# Patient Record
Sex: FEMALE | Race: BLACK OR AFRICAN AMERICAN | NOT HISPANIC OR LATINO | Employment: OTHER | URBAN - METROPOLITAN AREA
[De-identification: names, ages, dates, MRNs, and addresses within clinical notes are randomized per-mention and may not be internally consistent; named-entity substitution may affect disease eponyms.]

---

## 2017-03-03 ENCOUNTER — HOSPITAL ENCOUNTER (INPATIENT)
Facility: OTHER | Age: 52
LOS: 2 days | Discharge: HOME | DRG: 281 | End: 2017-03-06
Attending: EMERGENCY MEDICINE | Admitting: HOSPITALIST
Payer: COMMERCIAL

## 2017-03-03 DIAGNOSIS — R07.9 CHEST PAIN: ICD-10-CM

## 2017-03-03 DIAGNOSIS — I21.4 NON-ST ELEVATION MYOCARDIAL INFARCTION (NSTEMI): Primary | ICD-10-CM

## 2017-03-03 DIAGNOSIS — I10 ESSENTIAL HYPERTENSION: ICD-10-CM

## 2017-03-03 DIAGNOSIS — R07.9 ACUTE CHEST PAIN: ICD-10-CM

## 2017-03-03 DIAGNOSIS — I21.4 NSTEMI (NON-ST ELEVATED MYOCARDIAL INFARCTION): ICD-10-CM

## 2017-03-03 DIAGNOSIS — J45.20 MILD INTERMITTENT ASTHMA WITHOUT COMPLICATION: ICD-10-CM

## 2017-03-03 DIAGNOSIS — R07.1 CHEST PAIN ON BREATHING: ICD-10-CM

## 2017-03-03 DIAGNOSIS — I24.9 ACS (ACUTE CORONARY SYNDROME): ICD-10-CM

## 2017-03-03 DIAGNOSIS — I49.8 BIGEMINY: ICD-10-CM

## 2017-03-03 LAB
ALBUMIN SERPL BCP-MCNC: 3.9 G/DL
ALP SERPL-CCNC: 71 U/L
ALT SERPL W/O P-5'-P-CCNC: 32 U/L
ANION GAP SERPL CALC-SCNC: 7 MMOL/L
AST SERPL-CCNC: 23 U/L
BASOPHILS # BLD AUTO: 0.01 K/UL
BASOPHILS NFR BLD: 0.1 %
BILIRUB SERPL-MCNC: 0.7 MG/DL
BILIRUB UR QL STRIP: NEGATIVE
BNP SERPL-MCNC: 24 PG/ML
BUN SERPL-MCNC: 11 MG/DL
CALCIUM SERPL-MCNC: 9.2 MG/DL
CHLORIDE SERPL-SCNC: 106 MMOL/L
CLARITY UR: CLEAR
CO2 SERPL-SCNC: 28 MMOL/L
COLOR UR: YELLOW
CREAT SERPL-MCNC: 0.8 MG/DL
DIFFERENTIAL METHOD: ABNORMAL
EOSINOPHIL # BLD AUTO: 0.3 K/UL
EOSINOPHIL NFR BLD: 4.7 %
ERYTHROCYTE [DISTWIDTH] IN BLOOD BY AUTOMATED COUNT: 14.6 %
EST. GFR  (AFRICAN AMERICAN): >60 ML/MIN/1.73 M^2
EST. GFR  (NON AFRICAN AMERICAN): >60 ML/MIN/1.73 M^2
GLUCOSE SERPL-MCNC: 121 MG/DL
GLUCOSE UR QL STRIP: NEGATIVE
HCT VFR BLD AUTO: 37 %
HGB BLD-MCNC: 12.3 G/DL
HGB UR QL STRIP: NEGATIVE
KETONES UR QL STRIP: NEGATIVE
LEUKOCYTE ESTERASE UR QL STRIP: NEGATIVE
LYMPHOCYTES # BLD AUTO: 4.1 K/UL
LYMPHOCYTES NFR BLD: 58.6 %
MAGNESIUM SERPL-MCNC: 2.1 MG/DL
MCH RBC QN AUTO: 25.8 PG
MCHC RBC AUTO-ENTMCNC: 33.2 %
MCV RBC AUTO: 78 FL
MONOCYTES # BLD AUTO: 0.3 K/UL
MONOCYTES NFR BLD: 4.3 %
NEUTROPHILS # BLD AUTO: 2.2 K/UL
NEUTROPHILS NFR BLD: 32.2 %
NITRITE UR QL STRIP: NEGATIVE
PH UR STRIP: 7 [PH] (ref 5–8)
PLATELET # BLD AUTO: 322 K/UL
PMV BLD AUTO: 9.4 FL
POTASSIUM SERPL-SCNC: 3.6 MMOL/L
PROT SERPL-MCNC: 7.9 G/DL
PROT UR QL STRIP: NEGATIVE
RBC # BLD AUTO: 4.77 M/UL
SODIUM SERPL-SCNC: 141 MMOL/L
SP GR UR STRIP: 1.01 (ref 1–1.03)
TROPONIN I SERPL DL<=0.01 NG/ML-MCNC: 0.01 NG/ML
TROPONIN I SERPL DL<=0.01 NG/ML-MCNC: 0.7 NG/ML
URN SPEC COLLECT METH UR: NORMAL
UROBILINOGEN UR STRIP-ACNC: NEGATIVE EU/DL
WBC # BLD AUTO: 6.96 K/UL

## 2017-03-03 PROCEDURE — 99223 1ST HOSP IP/OBS HIGH 75: CPT | Mod: ,,, | Performed by: HOSPITALIST

## 2017-03-03 PROCEDURE — 25000003 PHARM REV CODE 250

## 2017-03-03 PROCEDURE — 25000003 PHARM REV CODE 250: Performed by: HOSPITALIST

## 2017-03-03 PROCEDURE — 84484 ASSAY OF TROPONIN QUANT: CPT | Mod: 91

## 2017-03-03 PROCEDURE — 63600175 PHARM REV CODE 636 W HCPCS

## 2017-03-03 PROCEDURE — 83880 ASSAY OF NATRIURETIC PEPTIDE: CPT

## 2017-03-03 PROCEDURE — 93010 ELECTROCARDIOGRAM REPORT: CPT | Mod: 76,,, | Performed by: INTERNAL MEDICINE

## 2017-03-03 PROCEDURE — 99285 EMERGENCY DEPT VISIT HI MDM: CPT | Mod: 25

## 2017-03-03 PROCEDURE — G0378 HOSPITAL OBSERVATION PER HR: HCPCS

## 2017-03-03 PROCEDURE — 80053 COMPREHEN METABOLIC PANEL: CPT

## 2017-03-03 PROCEDURE — 83735 ASSAY OF MAGNESIUM: CPT

## 2017-03-03 PROCEDURE — 81003 URINALYSIS AUTO W/O SCOPE: CPT

## 2017-03-03 PROCEDURE — 84484 ASSAY OF TROPONIN QUANT: CPT

## 2017-03-03 PROCEDURE — 36415 COLL VENOUS BLD VENIPUNCTURE: CPT

## 2017-03-03 PROCEDURE — 93005 ELECTROCARDIOGRAM TRACING: CPT

## 2017-03-03 PROCEDURE — 85025 COMPLETE CBC W/AUTO DIFF WBC: CPT

## 2017-03-03 RX ORDER — OLMESARTAN MEDOXOMIL 20 MG/1
40 TABLET ORAL DAILY
Status: DISCONTINUED | OUTPATIENT
Start: 2017-03-03 | End: 2017-03-06 | Stop reason: HOSPADM

## 2017-03-03 RX ORDER — ACETAMINOPHEN 325 MG/1
650 TABLET ORAL EVERY 8 HOURS PRN
Status: DISCONTINUED | OUTPATIENT
Start: 2017-03-03 | End: 2017-03-06 | Stop reason: HOSPADM

## 2017-03-03 RX ORDER — PANTOPRAZOLE SODIUM 40 MG/1
40 TABLET, DELAYED RELEASE ORAL DAILY
Status: DISCONTINUED | OUTPATIENT
Start: 2017-03-03 | End: 2017-03-06 | Stop reason: HOSPADM

## 2017-03-03 RX ORDER — ASPIRIN 325 MG
325 TABLET ORAL DAILY
Status: DISCONTINUED | OUTPATIENT
Start: 2017-03-03 | End: 2017-03-04

## 2017-03-03 RX ORDER — AMLODIPINE BESYLATE 5 MG/1
5 TABLET ORAL DAILY
Status: DISCONTINUED | OUTPATIENT
Start: 2017-03-03 | End: 2017-03-04

## 2017-03-03 RX ORDER — ONDANSETRON 8 MG/1
8 TABLET, ORALLY DISINTEGRATING ORAL EVERY 8 HOURS PRN
Status: DISCONTINUED | OUTPATIENT
Start: 2017-03-03 | End: 2017-03-06 | Stop reason: HOSPADM

## 2017-03-03 RX ORDER — ZOLPIDEM TARTRATE 5 MG/1
5 TABLET ORAL NIGHTLY PRN
Status: DISCONTINUED | OUTPATIENT
Start: 2017-03-03 | End: 2017-03-06 | Stop reason: HOSPADM

## 2017-03-03 RX ORDER — METOPROLOL TARTRATE 25 MG/1
25 TABLET, FILM COATED ORAL 2 TIMES DAILY PRN
Status: DISCONTINUED | OUTPATIENT
Start: 2017-03-03 | End: 2017-03-03

## 2017-03-03 RX ORDER — OLMESARTAN MEDOXOMIL AND HYDROCHLOROTHIAZIDE 40/25 40; 25 MG/1; MG/1
1 TABLET ORAL DAILY
Status: DISCONTINUED | OUTPATIENT
Start: 2017-03-03 | End: 2017-03-03

## 2017-03-03 RX ORDER — ALBUTEROL SULFATE 90 UG/1
1-2 AEROSOL, METERED RESPIRATORY (INHALATION) EVERY 4 HOURS PRN
Status: ON HOLD | COMMUNITY
End: 2017-03-06

## 2017-03-03 RX ORDER — HYDROCHLOROTHIAZIDE 25 MG/1
25 TABLET ORAL DAILY
Status: DISCONTINUED | OUTPATIENT
Start: 2017-03-03 | End: 2017-03-05

## 2017-03-03 RX ORDER — ENOXAPARIN SODIUM 100 MG/ML
40 INJECTION SUBCUTANEOUS EVERY 24 HOURS
Status: DISCONTINUED | OUTPATIENT
Start: 2017-03-03 | End: 2017-03-05

## 2017-03-03 RX ORDER — OXYCODONE HYDROCHLORIDE 5 MG/1
5 TABLET ORAL EVERY 4 HOURS PRN
Status: DISCONTINUED | OUTPATIENT
Start: 2017-03-03 | End: 2017-03-06 | Stop reason: HOSPADM

## 2017-03-03 RX ORDER — ATORVASTATIN CALCIUM 20 MG/1
80 TABLET, FILM COATED ORAL DAILY
Status: DISCONTINUED | OUTPATIENT
Start: 2017-03-03 | End: 2017-03-06 | Stop reason: HOSPADM

## 2017-03-03 RX ORDER — METOPROLOL TARTRATE 25 MG/1
25 TABLET, FILM COATED ORAL 2 TIMES DAILY
Status: DISCONTINUED | OUTPATIENT
Start: 2017-03-03 | End: 2017-03-04

## 2017-03-03 RX ADMIN — METOPROLOL TARTRATE 25 MG: 25 TABLET, FILM COATED ORAL at 06:03

## 2017-03-03 RX ADMIN — HYDROCHLOROTHIAZIDE 25 MG: 25 TABLET ORAL at 10:03

## 2017-03-03 RX ADMIN — AMLODIPINE BESYLATE 5 MG: 5 TABLET ORAL at 10:03

## 2017-03-03 RX ADMIN — METOPROLOL TARTRATE 25 MG: 25 TABLET, FILM COATED ORAL at 12:03

## 2017-03-03 RX ADMIN — PANTOPRAZOLE SODIUM 40 MG: 40 TABLET, DELAYED RELEASE ORAL at 12:03

## 2017-03-03 RX ADMIN — ATORVASTATIN CALCIUM 80 MG: 20 TABLET, FILM COATED ORAL at 06:03

## 2017-03-03 RX ADMIN — OLMESARTAN MEDOXOMIL 40 MG: 20 TABLET, FILM COATED ORAL at 10:03

## 2017-03-03 RX ADMIN — ENOXAPARIN SODIUM 40 MG: 100 INJECTION SUBCUTANEOUS at 12:03

## 2017-03-03 NOTE — IP AVS SNAPSHOT
[facility] Location ([facility])  [address] [place], [place] [address]  Phone: [phone]           Patient Discharge Instructions     Our goal is to set you up for success. This packet includes information on your condition, medications, and your home care. It will help you to care for yourself so you don't get sicker and need to go back to the hospital.     Please ask your nurse if you have any questions.        There are many details to remember when preparing to leave the hospital. Here is what you will need to do:    1. Take your medicine. If you are prescribed medications, review your Medication List in the following pages. You may have new medications to  at the pharmacy and others that you'll need to stop taking. Review the instructions for how and when to take your medications. Talk with your doctor or nurses if you are unsure of what to do.     2. Go to your follow-up appointments. Specific follow-up information is listed in the following pages. Your may be contacted by a transition nurse or clinical provider about future appointments. Be sure we have all of the phone numbers to reach you, if needed. Please contact your provider's office if you are unable to make an appointment.     3. Watch for warning signs. Your doctor or nurse will give you detailed warning signs to watch for and when to call for assistance. These instructions may also include educational information about your condition. If you experience any of warning signs to your health, call your doctor.               [facility]  Unless otherwise directed by your provider, please contact [facility], our nurse care line that is available for 24/7 assistance.     [phone] (toll-free)    Registered nurses in the [facility] provide clinical advisement, health education, appointment booking, and other advisory services.                    ** Verify the list of medication(s) below is accurate and up to  date. Carry this with you in case of emergency. If your medications have changed, please notify your healthcare provider.             Medication List      START taking these medications        Additional Info                      aspirin 81 MG EC tablet   Commonly known as:  ECOTRIN   Quantity:  30 tablet   Refills:  0   Dose:  81 mg    Last time this was given:  81 mg on 3/6/2017  3:51 PM   Instructions:  Take 1 tablet (81 mg total) by mouth once daily.     Begin Date    AM    Noon    PM    Bedtime       atorvastatin 80 MG tablet   Commonly known as:  LIPITOR   Quantity:  15 tablet   Refills:  0   Dose:  40 mg    Last time this was given:  80 mg on 3/6/2017  3:51 PM   Instructions:  Take 0.5 tablets (40 mg total) by mouth every evening.     Begin Date    AM    Noon    PM    Bedtime       carvedilol 12.5 MG tablet   Commonly known as:  COREG   Quantity:  60 tablet   Refills:  0   Dose:  12.5 mg    Instructions:  Take 1 tablet (12.5 mg total) by mouth 2 (two) times daily with meals.     Begin Date    AM    Noon    PM    Bedtime       clopidogrel 75 mg tablet   Commonly known as:  PLAVIX   Quantity:  30 tablet   Refills:  0   Dose:  75 mg    Last time this was given:  75 mg on 3/6/2017  3:51 PM   Instructions:  Take 1 tablet (75 mg total) by mouth once daily.     Begin Date    AM    Noon    PM    Bedtime         CHANGE how you take these medications        Additional Info                      albuterol 90 mcg/actuation inhaler   Commonly known as:  VENTOLIN HFA   Quantity:  18 g   Refills:  0   Dose:  2 puff   What changed:    - how much to take  - when to take this    Instructions:  Inhale 2 puffs into the lungs every 6 (six) hours as needed for Shortness of Breath. Rescue     Begin Date    AM    Noon    PM    Bedtime       amlodipine 10 MG tablet   Commonly known as:  NORVASC   Quantity:  30 tablet   Refills:  0   Dose:  10 mg   What changed:    - medication strength  - how much to take  - when to take this    Last  time this was given:  10 mg on 3/6/2017  3:51 PM   Instructions:  Take 1 tablet (10 mg total) by mouth once daily.     Begin Date    AM    Noon    PM    Bedtime         CONTINUE taking these medications        Additional Info                      BENICAR HCT 40-25 mg per tablet   Refills:  0   Dose:  1 tablet   Generic drug:  olmesartan-hydrochlorothiazide    Instructions:  Take 1 tablet by mouth every morning.     Begin Date    AM    Noon    PM    Bedtime       fluticasone 50 mcg/actuation nasal spray   Commonly known as:  FLONASE   Refills:  0   Dose:  1-2 spray    Instructions:  1-2 sprays by Nasal route once daily.     Begin Date    AM    Noon    PM    Bedtime            Where to Get Your Medications      These medications were sent to [facility]/pharmacy #[id number] - [place], [place]  [address] [facility]  [address] [facility] [address]     Phone:  [phone]     albuterol 90 mcg/actuation inhaler    amlodipine 10 MG tablet    aspirin 81 MG EC tablet    atorvastatin 80 MG tablet    carvedilol 12.5 MG tablet    clopidogrel 75 mg tablet                  Please bring to all follow up appointments:    1. A copy of your discharge instructions.  2. All medicines you are currently taking in their original bottles.  3. Identification and insurance card.    Please arrive 15 minutes ahead of scheduled appointment time.    Please call 24 hours in advance if you must reschedule your appointment and/or time.        Your Scheduled Appointments     Mar 16, 2017  1:00 PM CDT   New Patient with Artur Freire MD   [facility]    [address]  [place] [address] [phone]              Follow-up Information     Follow up with Brenden Neal MD. Schedule an appointment as soon as possible for a visit in 1 week.    Specialty:  Family Medicine    Contact information:    [address] [place] [address] [phone]          Follow up with Raiza Renee MD. Schedule an  "appointment as soon as possible for a visit in 2 weeks.    Specialty:  Cardiology    Contact information:    [address]  [facility] [address]  [phone]          Discharge Instructions     Future Orders    Activity as tolerated     Call MD for:  difficulty breathing or increased cough     Call MD for:  persistent dizziness, light-headedness, or visual disturbances     Call MD for:  persistent nausea and vomiting or diarrhea     Call MD for:  severe persistent headache     Call MD for:  severe uncontrolled pain     Call MD for:  temperature >100.4     Call MD for:  worsening rash     Diet Cardiac         Primary Diagnosis     Your primary diagnosis was:  Heart Attack      Admission Information     Date & Time Provider Department CSN    3/3/2017  8:26 AM Poncho Morales MD [facility] [id number]      Care Providers     Provider Role Specialty Primary office phone    Poncho Morales MD Attending Provider Hospitalist [phone]    Raiza Renee MD Consulting Physician  Cardiology [phone]    Raiza Renee MD Surgeon  Cardiology [phone]      Your Vitals Were     BP Pulse Temp Resp Height Weight    132/71 (BP Location: Right arm, Patient Position: Lying, BP Method: Automatic) 91 97.6 °F (36.4 °C) (Oral) 16 5' 3" (1.6 m) 78 kg (172 lb)    SpO2 BMI             100% 30.47 kg/m2         Recent Lab Values     No lab values to display.      Allergies as of 3/6/2017        Reactions    Ace Inhibitors       Advance Directives     An advance directive is a document which, in the event you are no longer able to make decisions for yourself, tells your healthcare team what kind of treatment you do or do not want to receive, or who you would like to make those decisions for you.  If you do not currently have an advance directive, [facility] encourages you to create one.  For more information call:  [phone]), [phone] ([phone]),  or log on to " [email or web address].        Language Assistance Services     ATTENTION: Language assistance services are available, free of charge. Please call [phone].      ATENCIÓN: Si habla harpreet, tiene a cabello disposición servicios gratuitos de asistencia lingüística. Llame al [phone].     CHÚ Ý: N?u b?n nói Ti?ng Vi?t, có các d?ch v? h? tr? ngôn ng? mi?n phí dành cho b?n. G?i s? [phone].         [facility] complies with applicable Federal civil rights laws and does not discriminate on the basis of race, color, national origin, age, disability, or sex.

## 2017-03-03 NOTE — PLAN OF CARE
03/03/17 1009   ED Admissions Case Approval   ED Admissions Case Approval (!) CM Approved  (OBS )

## 2017-03-03 NOTE — ED PROVIDER NOTES
"Encounter Date: 3/3/2017    SCRIBE #1 NOTE: I, Uli Negron, am scribing for, and in the presence of,  Dr. Victoria. I have scribed the entire note.       History     Chief Complaint   Patient presents with    Chest Pain     pt started having chest pain 3 days ago dyspnea on exertion with some nausea      Review of patient's allergies indicates:   Allergen Reactions    Ace inhibitors      HPI Comments: Time seen by provider: 8:30 AM    This is a 51 y.o. female with HTN who presents via EMS with complaint of intermittent CP starting 3 days ago. She notes accompanying SOB "like water in my lungs." She had some left arm pain and nausea as well as a HA and sinus drip. She denies vomiting, palpitations, fever, leg swelling and sweating. She has no personal or family history of MI. She denies smoking.    The history is provided by the patient.     Past Medical History:   Diagnosis Date    Abnormal Pap smear of cervix     Asthma     Hypertension      History reviewed. No pertinent surgical history.  Family History   Problem Relation Age of Onset    Breast cancer Neg Hx     Colon cancer Neg Hx     Ovarian cancer Neg Hx      Social History   Substance Use Topics    Smoking status: Never Smoker    Smokeless tobacco: None    Alcohol use No     Review of Systems   Constitutional: Negative for chills, diaphoresis and fever.   HENT: Negative for congestion and sore throat.    Eyes: Negative for pain.   Respiratory: Positive for shortness of breath. Negative for cough.    Cardiovascular: Positive for chest pain. Negative for palpitations and leg swelling.   Gastrointestinal: Negative for diarrhea, nausea and vomiting.   Genitourinary: Negative for difficulty urinating and dysuria.   Musculoskeletal: Negative for back pain and myalgias.   Skin: Negative for color change.   Neurological: Negative for light-headedness, numbness and headaches.   Psychiatric/Behavioral: Negative for behavioral problems and confusion. " "      Physical Exam     Vitals:    03/03/17 0800 03/03/17 0842 03/03/17 0942 03/03/17 1012   BP: 139/86 139/86 (!) 163/79 (!) 176/99   Pulse: 82 78 78 74   Resp: 20      Temp: 98.5 °F (36.9 °C)      TempSrc: Oral      SpO2: 99% 99% 97% 99%   Weight: 78 kg (172 lb)      Height: 5' 3" (1.6 m)       03/03/17 1050 03/03/17 1053 03/03/17 1142 03/03/17 1200   BP: (!) 182/117 (!) 182/117 (!) 158/99    Pulse:  80 72 87   Resp:       Temp:       TempSrc:       SpO2:  100% 98%    Weight:       Height:        03/03/17 1221 03/03/17 1223   BP: (!) 185/92 (!) 178/90   Pulse: 84    Resp: 18    Temp: 98 °F (36.7 °C)    TempSrc: Oral    SpO2: 95%    Weight:     Height:        Physical Exam    Nursing note and vitals reviewed.  Constitutional: She appears well-developed and well-nourished. She is not diaphoretic. No distress.   HENT:   Head: Normocephalic and atraumatic.   Mouth/Throat: Oropharynx is clear and moist.   Eyes: Conjunctivae are normal. Pupils are equal, round, and reactive to light. Right eye exhibits no discharge. Left eye exhibits no discharge.   Neck: Normal range of motion. Neck supple. No JVD present.   Cardiovascular: Normal rate, regular rhythm, normal heart sounds and intact distal pulses. Exam reveals no gallop and no friction rub.    No murmur heard.  Pulmonary/Chest: Breath sounds normal. No respiratory distress. She has no wheezes. She has no rhonchi. She has no rales.   Abdominal: Soft. Bowel sounds are normal. She exhibits no distension. There is no tenderness. There is no rebound and no guarding.   Musculoskeletal: Normal range of motion. She exhibits edema (trace non-pitting edema to the ankles bilaterally). She exhibits no tenderness.   Neurological: She is alert and oriented to person, place, and time. She has normal strength. No sensory deficit.   Skin: Skin is warm and dry. No rash and no abscess noted. No erythema. No pallor.   Psychiatric: She has a normal mood and affect. Her behavior is normal. " Judgment and thought content normal.         ED Course   Critical Care  Date/Time: 3/3/2017 12:00 PM  Performed by: EVANGELINA URIBE  Authorized by: ALBER RIZO   Direct patient critical care time: 30 minutes  Additional history critical care time: 5 minutes  Ordering / reviewing critical care time: 5 minutes  Consulting other physicians critical care time: 5 minutes  Total critical care time (exclusive of procedural time) : 45 minutes  Critical care was necessary to treat or prevent imminent or life-threatening deterioration of the following conditions: cardiac failure.  Critical care was time spent personally by me on the following activities: development of treatment plan with patient or surrogate, evaluation of patient's response to treatment, examination of patient, obtaining history from patient or surrogate, ordering and performing treatments and interventions, ordering and review of laboratory studies, ordering and review of radiographic studies, pulse oximetry, re-evaluation of patient's condition and review of old charts.        Labs Reviewed   CBC W/ AUTO DIFFERENTIAL - Abnormal; Notable for the following:        Result Value    MCV 78 (*)     MCH 25.8 (*)     RDW 14.6 (*)     Gran% 32.2 (*)     Lymph% 58.6 (*)     All other components within normal limits   COMPREHENSIVE METABOLIC PANEL - Abnormal; Notable for the following:     Glucose 121 (*)     Anion Gap 7 (*)     All other components within normal limits   MAGNESIUM   TROPONIN I   URINALYSIS   B-TYPE NATRIURETIC PEPTIDE     EKG Readings: (Independently Interpreted)   Initial Reading: No STEMI.   Sinus rhythm at a rate of 87 with bigeminy. Normal axis. Alternating narrow then prolonged QRS complexes. No STEMI.        X-Rays:   Independently Interpreted Readings:   Chest X-Ray: No cardiomegaly. No infiltrate. No pneumothorax.      Imaging Results         X-Ray Chest 1 View (Final result) Result time:  03/03/17 08:54:04    Final result by Thong BUCK  MD Pravin (03/03/17 08:54:04)    Impression:      No acute abnormality.      Electronically signed by: JEET CLAYTON  Date:     03/03/17  Time:    08:54     Narrative:    CLINICAL HISTORY:  Chest pain    TECHNIQUE: Single view portable frontal radiograph of the chest    COMPARISON: N/A      FINDINGS:  Support devices: None    Chest: Cardiac silhouette is borderline enlarged though exaggerated by body habitus and portable technique.  Lungs are well aerated and clear.  No pleural effusion or pneumothorax.    Upper Abdomen: Normal.    Other: N/A.             Medical Decision Making:   Initial Assessment:   Urgent evaluation a 51-year-old female with history of hypertension and asthma presenting with 3 days of intermittent chest discomfort, worse today with radiation to the left upper extremity.  Patient endorses nausea accompanying the chest discomfort, relieved after aspirin and nitroglycerin administration by EMS.  Patient denies history of known CAD, no family history of MI.  Patient does endorse decreased exercise tolerance.  On exam patient is well-appearing, no reproducible chest wall tenderness, EKG notable for bigeminy with no known history of dysrhythmia.  We will obtain cardiac biomarkers, chest x-ray, likely admit given abnormal EKG and atypical chest pain.  Clinical Tests:   Lab Tests: Ordered and Reviewed  Radiological Study: Ordered and Reviewed  Medical Tests: Ordered and Reviewed  Other:   I have discussed this case with another health care provider.       <> Summary of the Discussion: 9:32 AM - I paged the hospitalist.   9:40 AM - I discussed the case with the hospitalist (Adonis Gonzalez PA-C) who will admit the patient and place them in Obs for ACS.     Additional MDM:   EKG: I have independently interpreted EKG(s) - see notes.   X-Rays: I have independently interpreted X-Ray(s) - see notes.          Scribe Attestation:   Scribe #1: I performed the above scribed service and the documentation accurately  describes the services I performed. I attest to the accuracy of the note.    Attending Attestation:           Physician Attestation for Scribe:  Physician Attestation Statement for Scribe #1: I, Dr. Victoria, reviewed documentation, as scribed by Uli Negron in my presence, and it is both accurate and complete.                 ED Course     Clinical Impression:     1. ACS (acute coronary syndrome)    2. Chest pain    3. Bigeminy         Disposition:   Disposition: Placed in Observation  Condition: Stable       Jordyn Victoria MD  03/03/17 1229       Jordyn Victoria MD  04/05/17 1433

## 2017-03-03 NOTE — ED TRIAGE NOTES
Intermittent left chest pain radiating to left arm x 3 days, worsening this morning, rated a 10 on a scale 1-10. Shortness of breath on exertion. No pain at this time. Denies cardiac history. EMS gave 325 asa and 1 spray of nitro.

## 2017-03-03 NOTE — H&P
[facility]  History & Physical    Subjective:      Chief Complaint/Reason for Admission: Chest pain     Ms. Ryanne Mccauley is a 51 year-old woman with a history of hypertension and asthma who presents with 2 day history of dyspnea on exertion and then chest pressure which started this morning at around 7:45 am following by pain on her left arm and chest.  Patient also reports feeling diaphoretic and called EMS.  During ambulation ride to the hospital she reports feeling palpations which again repeated palpations each time for about a few minutes.          Past Medical History:   Diagnosis Date    Abnormal Pap smear of cervix     Asthma     Hypertension      History reviewed. No pertinent surgical history.  Family History   Problem Relation Age of Onset    Breast cancer Neg Hx     Colon cancer Neg Hx     Ovarian cancer Neg Hx      Social History   Substance Use Topics    Smoking status: Never Smoker    Smokeless tobacco: None    Alcohol use No       PTA Medications   Medication Sig    albuterol (VENTOLIN HFA) 90 mcg/actuation inhaler Inhale 1-2 puffs into the lungs every 4 (four) hours as needed for Shortness of Breath. Rescue    amlodipine (NORVASC) 5 MG tablet Take 5 mg by mouth every morning.     BENICAR HCT 40-25 mg per tablet Take 1 tablet by mouth every morning.     fluticasone (FLONASE) 50 mcg/actuation nasal spray 1-2 sprays by Nasal route once daily.      Review of patient's allergies indicates:   Allergen Reactions    Ace inhibitors         Review of Systems   Constitutional: Negative for chills, diaphoresis, fever, malaise/fatigue and weight loss.   HENT: Negative for congestion, ear discharge, ear pain, hearing loss, nosebleeds, sore throat and tinnitus.    Eyes: Negative for blurred vision, double vision, photophobia, pain, discharge and redness.   Respiratory: Positive for sputum production. Negative for cough, hemoptysis, shortness of breath, wheezing and  stridor.    Cardiovascular: Positive for chest pain. Negative for palpitations, orthopnea, claudication, leg swelling and PND.   Gastrointestinal: Negative for abdominal pain, blood in stool, constipation, diarrhea, heartburn, melena, nausea and vomiting.   Genitourinary: Negative for dysuria, flank pain, frequency, hematuria and urgency.   Musculoskeletal: Negative for back pain, falls, joint pain, myalgias and neck pain.   Skin: Negative for itching and rash.   Neurological: Negative for dizziness, tingling, tremors, sensory change, speech change, focal weakness, seizures, loss of consciousness, weakness and headaches.   Endo/Heme/Allergies: Negative for environmental allergies and polydipsia. Does not bruise/bleed easily.   Psychiatric/Behavioral: Negative for depression, hallucinations, memory loss, substance abuse and suicidal ideas. The patient is not nervous/anxious and does not have insomnia.        Objective:      Vital Signs (Most Recent)  Temp: 98.1 °F (36.7 °C) (03/03/17 1642)  Pulse: 72 (03/03/17 1642)  Resp: 18 (03/03/17 1642)  BP: (!) 134/97 (03/03/17 1642)  SpO2: 100 % (03/03/17 1642)    Vital Signs Range (Last 24H):  Temp:  [98 °F (36.7 °C)-98.5 °F (36.9 °C)]   Pulse:  [72-87]   Resp:  [18-20]   BP: (134-185)/()   SpO2:  [95 %-100 %]     Physical Exam   Constitutional: She is oriented to person, place, and time. She appears well-developed and well-nourished. No distress.   HENT:   Head: Normocephalic and atraumatic.   Nose: Nose normal.   Mouth/Throat: Oropharynx is clear and moist. No oropharyngeal exudate.   Eyes: Conjunctivae and EOM are normal. Pupils are equal, round, and reactive to light. Right eye exhibits no discharge. Left eye exhibits no discharge. No scleral icterus.   Neck: Normal range of motion. Neck supple. No JVD present. No tracheal deviation present. No thyromegaly present.   Cardiovascular: Normal rate, regular rhythm, normal heart sounds and intact distal pulses.  Exam  reveals no gallop and no friction rub.    No murmur heard.  Pulmonary/Chest: Effort normal and breath sounds normal. No stridor. No respiratory distress. She has no wheezes. She has no rales. She exhibits no tenderness.   Abdominal: Soft. Bowel sounds are normal. She exhibits no distension and no mass. There is no tenderness. There is no rebound and no guarding.   Musculoskeletal: Normal range of motion. She exhibits no edema or tenderness.   Lymphadenopathy:     She has no cervical adenopathy.   Neurological: She is alert and oriented to person, place, and time. She has normal reflexes. She displays normal reflexes. No cranial nerve deficit. She exhibits normal muscle tone. Coordination normal.   Skin: Skin is warm and dry. No rash noted. She is not diaphoretic. No erythema. No pallor.   Psychiatric: She has a normal mood and affect. Her behavior is normal. Judgment and thought content normal.       Data Review:    CBC:   Lab Results   Component Value Date    WBC 6.96 03/03/2017    RBC 4.77 03/03/2017    HGB 12.3 03/03/2017    HCT 37.0 03/03/2017     03/03/2017     BMP:   Lab Results   Component Value Date     (H) 03/03/2017     03/03/2017    K 3.6 03/03/2017     03/03/2017    CO2 28 03/03/2017    BUN 11 03/03/2017    CREATININE 0.8 03/03/2017    CALCIUM 9.2 03/03/2017     ECG: normal sinus rhythm, no ischemic changes, q wave in lead III    Assessment/Plan:     Active Hospital Problems    Diagnosis  POA    Acute chest pain [R07.9]  Yes    Essential hypertension [I10]  Yes    Mild intermittent asthma without complication [J45.20]  Yes      Resolved Hospital Problems    Diagnosis Date Resolved POA   No resolved problems to display.     Chest pain.  Will treat with aspirin, beta-blocker, and statin therapy and rule out for acute myocardial infarction with serial troponins and electrocardiograms.  Patent had one electrocardiogram which revealed bigeminy but now in regular sinus.  Monitor on  telemetry for arrhthymias.    Hypertension.  Poorly controlled.  Restart home regimen and see response to the addition of beta-blocker.    Asthma.  Stable.    DVT prophylaxis.  Subcutaneous enoxaparin.

## 2017-03-03 NOTE — PLAN OF CARE
CM met with pt at bedside for initial discharge planning assessment. Pt states she is independent and will likely have no needs for discharge.  Pt states her PCP is Brenden Neal MD on [facility].  CM updated PCP in EPIC.  No further CM needs.     03/03/17 2174   Discharge Assessment   Assessment Type Discharge Planning Assessment   Confirmed/corrected address and phone number on facesheet? Yes   Assessment information obtained from? Patient;Medical Record   Expected Length of Stay (days) 2   Type of Healthcare Directive Received (pt states none at this time)   Prior to hospitilization cognitive status: Alert/Oriented   Prior to hospitalization functional status: Independent   Current cognitive status: Alert/Oriented   Current Functional Status: Independent   Arrived From home or self-care   Lives With spouse   Able to Return to Prior Arrangements yes   Is patient able to care for self after discharge? Yes   How many people do you have in your home that can help with your care after discharge? 1   Who are your caregiver(s) and their phone number(s)? Lon Mccauley, spouse, [phone]   Patient's perception of discharge disposition home or selfcare   Readmission Within The Last 30 Days no previous admission in last 30 days   Patient currently being followed by outpatient case management? No   Patient currently receives home health services? No   Does the patient currently use HME? No   Patient currently receives private duty nursing? No   Patient currently receives any other outside agency services? No   Equipment Currently Used at Home none   Do you have any problems affording any of your prescribed medications? No   Is the patient taking medications as prescribed? yes   Do you have any financial concerns preventing you from receiving the healthcare you need? No   Does the patient have transportation to healthcare appointments? Yes   Transportation Available car;family or friend will provide   Does the patient  receive services at the [facility]? No   Are there any open cases? No   Discharge Plan A Home   Discharge Plan B Home   Patient/Family In Agreement With Plan yes

## 2017-03-04 PROBLEM — I21.4 NSTEMI (NON-ST ELEVATED MYOCARDIAL INFARCTION): Status: ACTIVE | Noted: 2017-03-04

## 2017-03-04 LAB
ANION GAP SERPL CALC-SCNC: 11 MMOL/L
BASOPHILS # BLD AUTO: 0.01 K/UL
BASOPHILS NFR BLD: 0.1 %
BUN SERPL-MCNC: 14 MG/DL
CALCIUM SERPL-MCNC: 9.6 MG/DL
CHLORIDE SERPL-SCNC: 104 MMOL/L
CO2 SERPL-SCNC: 23 MMOL/L
CREAT SERPL-MCNC: 0.9 MG/DL
DIASTOLIC DYSFUNCTION: NO
DIFFERENTIAL METHOD: ABNORMAL
EOSINOPHIL # BLD AUTO: 0.4 K/UL
EOSINOPHIL NFR BLD: 4.8 %
ERYTHROCYTE [DISTWIDTH] IN BLOOD BY AUTOMATED COUNT: 14.6 %
EST. GFR  (AFRICAN AMERICAN): >60 ML/MIN/1.73 M^2
EST. GFR  (NON AFRICAN AMERICAN): >60 ML/MIN/1.73 M^2
ESTIMATED PA SYSTOLIC PRESSURE: 28.81
GLOBAL PERICARDIAL EFFUSION: NORMAL
GLUCOSE SERPL-MCNC: 117 MG/DL
HCT VFR BLD AUTO: 38 %
HGB BLD-MCNC: 12.7 G/DL
LYMPHOCYTES # BLD AUTO: 3.4 K/UL
LYMPHOCYTES NFR BLD: 44.6 %
MAGNESIUM SERPL-MCNC: 2.1 MG/DL
MCH RBC QN AUTO: 25.9 PG
MCHC RBC AUTO-ENTMCNC: 33.4 %
MCV RBC AUTO: 77 FL
MONOCYTES # BLD AUTO: 0.5 K/UL
MONOCYTES NFR BLD: 6 %
NEUTROPHILS # BLD AUTO: 3.4 K/UL
NEUTROPHILS NFR BLD: 44.4 %
PHOSPHATE SERPL-MCNC: 4.1 MG/DL
PLATELET # BLD AUTO: 322 K/UL
PMV BLD AUTO: 9.5 FL
POTASSIUM SERPL-SCNC: 3.4 MMOL/L
RBC # BLD AUTO: 4.91 M/UL
RETIRED EF AND QEF - SEE NOTES: 65 (ref 55–65)
SODIUM SERPL-SCNC: 138 MMOL/L
TROPONIN I SERPL DL<=0.01 NG/ML-MCNC: 0.24 NG/ML
TROPONIN I SERPL DL<=0.01 NG/ML-MCNC: 0.38 NG/ML
WBC # BLD AUTO: 7.54 K/UL

## 2017-03-04 PROCEDURE — 84484 ASSAY OF TROPONIN QUANT: CPT | Mod: 91

## 2017-03-04 PROCEDURE — 84484 ASSAY OF TROPONIN QUANT: CPT

## 2017-03-04 PROCEDURE — 80048 BASIC METABOLIC PNL TOTAL CA: CPT

## 2017-03-04 PROCEDURE — 99233 SBSQ HOSP IP/OBS HIGH 50: CPT | Mod: ,,, | Performed by: HOSPITALIST

## 2017-03-04 PROCEDURE — 36415 COLL VENOUS BLD VENIPUNCTURE: CPT

## 2017-03-04 PROCEDURE — 11000001 HC ACUTE MED/SURG PRIVATE ROOM

## 2017-03-04 PROCEDURE — 25000003 PHARM REV CODE 250

## 2017-03-04 PROCEDURE — 85025 COMPLETE CBC W/AUTO DIFF WBC: CPT

## 2017-03-04 PROCEDURE — 63600175 PHARM REV CODE 636 W HCPCS

## 2017-03-04 PROCEDURE — 25000003 PHARM REV CODE 250: Performed by: INTERNAL MEDICINE

## 2017-03-04 PROCEDURE — 93306 TTE W/DOPPLER COMPLETE: CPT

## 2017-03-04 PROCEDURE — 25000003 PHARM REV CODE 250: Performed by: HOSPITALIST

## 2017-03-04 PROCEDURE — 83735 ASSAY OF MAGNESIUM: CPT

## 2017-03-04 PROCEDURE — 84100 ASSAY OF PHOSPHORUS: CPT

## 2017-03-04 RX ORDER — METOPROLOL TARTRATE 50 MG/1
50 TABLET ORAL 2 TIMES DAILY
Status: DISCONTINUED | OUTPATIENT
Start: 2017-03-04 | End: 2017-03-06

## 2017-03-04 RX ORDER — ASPIRIN 81 MG/1
81 TABLET ORAL DAILY
Status: DISCONTINUED | OUTPATIENT
Start: 2017-03-04 | End: 2017-03-06 | Stop reason: HOSPADM

## 2017-03-04 RX ORDER — HYDRALAZINE HYDROCHLORIDE 25 MG/1
25 TABLET, FILM COATED ORAL EVERY 8 HOURS
Status: DISCONTINUED | OUTPATIENT
Start: 2017-03-04 | End: 2017-03-06

## 2017-03-04 RX ORDER — AMLODIPINE BESYLATE 5 MG/1
10 TABLET ORAL DAILY
Status: DISCONTINUED | OUTPATIENT
Start: 2017-03-04 | End: 2017-03-06 | Stop reason: HOSPADM

## 2017-03-04 RX ORDER — CLOPIDOGREL BISULFATE 75 MG/1
300 TABLET ORAL ONCE
Status: COMPLETED | OUTPATIENT
Start: 2017-03-04 | End: 2017-03-04

## 2017-03-04 RX ORDER — CLOPIDOGREL BISULFATE 75 MG/1
75 TABLET ORAL DAILY
Status: DISCONTINUED | OUTPATIENT
Start: 2017-03-05 | End: 2017-03-06 | Stop reason: HOSPADM

## 2017-03-04 RX ADMIN — ACETAMINOPHEN 650 MG: 325 TABLET ORAL at 02:03

## 2017-03-04 RX ADMIN — ATORVASTATIN CALCIUM 80 MG: 20 TABLET, FILM COATED ORAL at 08:03

## 2017-03-04 RX ADMIN — HYDROCHLOROTHIAZIDE 25 MG: 25 TABLET ORAL at 08:03

## 2017-03-04 RX ADMIN — HYDRALAZINE HYDROCHLORIDE 25 MG: 25 TABLET, FILM COATED ORAL at 08:03

## 2017-03-04 RX ADMIN — AMLODIPINE BESYLATE 10 MG: 5 TABLET ORAL at 08:03

## 2017-03-04 RX ADMIN — METOPROLOL TARTRATE 50 MG: 50 TABLET ORAL at 08:03

## 2017-03-04 RX ADMIN — HYDRALAZINE HYDROCHLORIDE 25 MG: 25 TABLET, FILM COATED ORAL at 01:03

## 2017-03-04 RX ADMIN — CLOPIDOGREL 300 MG: 75 TABLET, FILM COATED ORAL at 04:03

## 2017-03-04 RX ADMIN — ENOXAPARIN SODIUM 40 MG: 100 INJECTION SUBCUTANEOUS at 12:03

## 2017-03-04 RX ADMIN — OLMESARTAN MEDOXOMIL 40 MG: 20 TABLET, FILM COATED ORAL at 08:03

## 2017-03-04 RX ADMIN — ASPIRIN 325 MG ORAL TABLET 325 MG: 325 PILL ORAL at 08:03

## 2017-03-04 RX ADMIN — PANTOPRAZOLE SODIUM 40 MG: 40 TABLET, DELAYED RELEASE ORAL at 08:03

## 2017-03-04 RX ADMIN — ASPIRIN 81 MG: 81 TABLET, COATED ORAL at 04:03

## 2017-03-04 NOTE — PROGRESS NOTES
Progress Note  [facility]      Admit Date: 3/3/2017 (LOS: 0)    SUBJECTIVE:     Follow-up For:  NSTEMI (non-ST elevated myocardial infarction)    HPI/Interval history: Patient ruled in for non-ST elevation myocardial infarction.  She continues to have some chest pressure with exertion but now at rest this morning.  She shortness of breath associated with chest pain.    Review of Systems: No fevers, chills.  No nausea, vomiting, or diarrhea.    Medications: Reviewed and documented in the MAR.    OBJECTIVE:     Vital Signs (Most Recent)  Temp: 98.6 °F (37 °C) (03/04/17 0400)  Pulse: 70 (03/04/17 0600)  Resp: 18 (03/04/17 0400)  BP: (!) 144/93 (03/04/17 0400)  SpO2: 98 % (03/04/17 0400)    Vital Signs Range (Last 24H):  Temp:  [97.8 °F (36.6 °C)-98.6 °F (37 °C)]   Pulse:  []   Resp:  [18-20]   BP: (134-185)/()   SpO2:  [95 %-100 %]     I & O (Last 24H):  Intake/Output Summary (Last 24 hours) at 03/04/17 0720  Last data filed at 03/03/17 2200   Gross per 24 hour   Intake              150 ml   Output                0 ml   Net              150 ml       BMI: Body mass index is 30.47 kg/(m^2).    Physical Exam:  General: Patient is awake, alert, and oriented, and in no acute distress.  Eyes: Pupils equal, round, and reactive to light, anicteric sclera.  Mouth: No lesions, moist mucous membranes.  Respiratory: Lungs clear to ausculation, no crackles or wheezing.  Cardiovascular: Regular rate and rythmn, no murmurs appreciated.  Abdomen: Soft, non-tender, non-distended, normal bowel sounds.  Skin: No rashes or breakdown.  Extremities: No edema, cyanosis, or clubbing.  Neuro: No focal weakness.    Diagnostic Results:  CBC:    Recent Labs  Lab 03/03/17  0840 03/04/17  0444   WBC 6.96 7.54   HGB 12.3 12.7   HCT 37.0 38.0    322   MCV 78* 77*     BMP:    Recent Labs  Lab 03/03/17  0840 03/04/17  0444   * 117*    138   K 3.6 3.4*    104   CO2 28 23   BUN 11 14   CREATININE 0.8 0.9    CALCIUM 9.2 9.6   MG 2.1 2.1   PHOS  --  4.1       ASSESSMENT/PLAN:     Active Hospital Problems    Diagnosis  POA    *NSTEMI (non-ST elevated myocardial infarction) [I21.4]  Yes    Acute chest pain [R07.9]  Yes    Essential hypertension [I10]  Yes    Mild intermittent asthma without complication [J45.20]  Yes    ACS (acute coronary syndrome) [I24.9]  Yes    Chest pain on breathing [R07.1]  Yes      Resolved Hospital Problems    Diagnosis Date Resolved POA   No resolved problems to display.       Non-ST elevation myocardial infraction.  Patient presented with episode of chest pressure which radiated to her left arm with ischemic changes on electrocardiogram and serum troponin peaked at 0.696 ng/mL and now trending down.  Continue with current medical therapy and I have consulted cardiology for evaluation and further recommendations.  Will differ further antiplatelet therapy and anticoagulation to Dr. Raiza Renee.  Echocardiogram ordered for this morning.     Hypertension.  Poorly controlled. In addition to olmesartan 40 mg oral daily, hydrochlorothiazide 25 mg oral daily, will increase metoprolol to 50 mg twice daily, increase amlodipine to 10 mg oral daily, and also start hydralazine 25 mg three times day.     Asthma. Stable.     DVT prophylaxis.  Subcutaneous enoxaparin.

## 2017-03-04 NOTE — SUBJECTIVE & OBJECTIVE
Past Medical History:   Diagnosis Date    Abnormal Pap smear of cervix     Asthma     Hypertension        History reviewed. No pertinent surgical history.    Review of patient's allergies indicates:   Allergen Reactions    Ace inhibitors        No current facility-administered medications on file prior to encounter.      Current Outpatient Prescriptions on File Prior to Encounter   Medication Sig    amlodipine (NORVASC) 5 MG tablet Take 5 mg by mouth every morning.     BENICAR HCT 40-25 mg per tablet Take 1 tablet by mouth every morning.     fluticasone (FLONASE) 50 mcg/actuation nasal spray 1-2 sprays by Nasal route once daily.      Family History     None        Social History Main Topics    Smoking status: Never Smoker    Smokeless tobacco: Not on file    Alcohol use No    Drug use: No    Sexual activity: Yes     Partners: Male     Review of Systems   Constitution: Negative for chills, fever, weakness and malaise/fatigue.   HENT: Negative for headaches and nosebleeds.    Eyes: Negative for double vision, vision loss in left eye and vision loss in right eye.   Cardiovascular: Positive for chest pain and dyspnea on exertion. Negative for claudication, irregular heartbeat, leg swelling, near-syncope, orthopnea, palpitations, paroxysmal nocturnal dyspnea and syncope.   Respiratory: Positive for shortness of breath. Negative for cough, hemoptysis and wheezing.    Endocrine: Negative for cold intolerance and heat intolerance.   Hematologic/Lymphatic: Negative for bleeding problem. Does not bruise/bleed easily.   Skin: Negative for color change and rash.   Musculoskeletal: Positive for joint pain (left shoulder). Negative for back pain, falls, muscle weakness and myalgias.   Gastrointestinal: Negative for heartburn, hematemesis, hematochezia, hemorrhoids, jaundice, melena, nausea and vomiting.   Genitourinary: Negative for dysuria and hematuria.   Neurological: Negative for dizziness, focal weakness,  light-headedness, loss of balance, numbness and vertigo.   Psychiatric/Behavioral: Negative for altered mental status, depression and memory loss. The patient is not nervous/anxious.    Allergic/Immunologic: Negative for hives and persistent infections.     Objective:     Vital Signs (Most Recent):  Temp: 97.7 °F (36.5 °C) (03/04/17 1300)  Pulse: 76 (03/04/17 1400)  Resp: 18 (03/04/17 1300)  BP: (!) 125/95 (03/04/17 1300)  SpO2: 99 % (03/04/17 1300) Vital Signs (24h Range):  Temp:  [97.7 °F (36.5 °C)-98.6 °F (37 °C)] 97.7 °F (36.5 °C)  Pulse:  [] 76  Resp:  [18] 18  SpO2:  [95 %-100 %] 99 %  BP: (125-144)/(90-97) 125/95     Weight: 78 kg (172 lb)  Body mass index is 30.47 kg/(m^2).    SpO2: 99 %  O2 Device (Oxygen Therapy): room air      Intake/Output Summary (Last 24 hours) at 03/04/17 1453  Last data filed at 03/03/17 2200   Gross per 24 hour   Intake              150 ml   Output                0 ml   Net              150 ml       Lines/Drains/Airways     Peripheral Intravenous Line                 Peripheral IV - Single Lumen 03/03/17 0835 Right Antecubital 1 day                Physical Exam   Constitutional: She is oriented to person, place, and time. She appears well-developed and well-nourished.  Non-toxic appearance. No distress.   HENT:   Head: Normocephalic and atraumatic.   Nose: Nose normal.   Eyes: Right eye exhibits no discharge. Left eye exhibits no discharge. Right conjunctiva is not injected. Left conjunctiva is not injected. Right pupil is round. Left pupil is round. Pupils are equal.   Neck: Neck supple. No JVD present. Carotid bruit is not present. No thyromegaly present.   Cardiovascular: Normal rate, regular rhythm, S1 normal and S2 normal.   No extrasystoles are present. PMI is not displaced.  Exam reveals gallop and S4.    Pulses:       Radial pulses are 2+ on the right side, and 2+ on the left side.        Femoral pulses are 2+ on the right side, and 2+ on the left side.       Dorsalis  pedis pulses are 2+ on the right side, and 2+ on the left side.        Posterior tibial pulses are 2+ on the right side, and 2+ on the left side.   Pulmonary/Chest: Effort normal and breath sounds normal.   Abdominal: Soft. Normal appearance. There is no hepatosplenomegaly. There is no tenderness.   Musculoskeletal:        Right ankle: She exhibits no swelling, no ecchymosis and no deformity.        Left ankle: She exhibits no swelling, no ecchymosis and no deformity.   Lymphadenopathy:        Head (right side): No submandibular adenopathy present.        Head (left side): No submandibular adenopathy present.     She has no cervical adenopathy.   Neurological: She is alert and oriented to person, place, and time. She is not disoriented. No cranial nerve deficit.   Skin: Skin is warm, dry and intact. No rash noted. She is not diaphoretic. No cyanosis. No pallor. Nails show no clubbing.   Psychiatric: She has a normal mood and affect. Her speech is normal and behavior is normal. Judgment and thought content normal. Cognition and memory are normal.       Current Medications:     amlodipine  10 mg Oral Daily    aspirin  325 mg Oral Daily    atorvastatin  80 mg Oral Daily    enoxaparin  40 mg Subcutaneous Daily    hydrALAZINE  25 mg Oral Q8H    hydrochlorothiazide  25 mg Oral Daily    metoprolol tartrate  50 mg Oral BID    olmesartan  40 mg Oral Daily    pantoprazole  40 mg Oral Daily     Current Laboratory Results:    Recent Results (from the past 24 hour(s))   Troponin I    Collection Time: 03/03/17  6:20 PM   Result Value Ref Range    Troponin I 0.696 (H) 0.000 - 0.026 ng/mL   Troponin I    Collection Time: 03/04/17 12:18 AM   Result Value Ref Range    Troponin I 0.379 (H) 0.000 - 0.026 ng/mL   Basic metabolic panel    Collection Time: 03/04/17  4:44 AM   Result Value Ref Range    Sodium 138 136 - 145 mmol/L    Potassium 3.4 (L) 3.5 - 5.1 mmol/L    Chloride 104 95 - 110 mmol/L    CO2 23 23 - 29 mmol/L     Glucose 117 (H) 70 - 110 mg/dL    BUN, Bld 14 6 - 20 mg/dL    Creatinine 0.9 0.5 - 1.4 mg/dL    Calcium 9.6 8.7 - 10.5 mg/dL    Anion Gap 11 8 - 16 mmol/L    eGFR if African American >60 >60 mL/min/1.73 m^2    eGFR if non African American >60 >60 mL/min/1.73 m^2   Magnesium    Collection Time: 03/04/17  4:44 AM   Result Value Ref Range    Magnesium 2.1 1.6 - 2.6 mg/dL   Phosphorus    Collection Time: 03/04/17  4:44 AM   Result Value Ref Range    Phosphorus 4.1 2.7 - 4.5 mg/dL   CBC auto differential    Collection Time: 03/04/17  4:44 AM   Result Value Ref Range    WBC 7.54 3.90 - 12.70 K/uL    RBC 4.91 4.00 - 5.40 M/uL    Hemoglobin 12.7 12.0 - 16.0 g/dL    Hematocrit 38.0 37.0 - 48.5 %    MCV 77 (L) 82 - 98 fL    MCH 25.9 (L) 27.0 - 31.0 pg    MCHC 33.4 32.0 - 36.0 %    RDW 14.6 (H) 11.5 - 14.5 %    Platelets 322 150 - 350 K/uL    MPV 9.5 9.2 - 12.9 fL    Gran # 3.4 1.8 - 7.7 K/uL    Lymph # 3.4 1.0 - 4.8 K/uL    Mono # 0.5 0.3 - 1.0 K/uL    Eos # 0.4 0.0 - 0.5 K/uL    Baso # 0.01 0.00 - 0.20 K/uL    Gran% 44.4 38.0 - 73.0 %    Lymph% 44.6 18.0 - 48.0 %    Mono% 6.0 4.0 - 15.0 %    Eosinophil% 4.8 0.0 - 8.0 %    Basophil% 0.1 0.0 - 1.9 %    Differential Method Automated    Troponin I    Collection Time: 03/04/17  4:44 AM   Result Value Ref Range    Troponin I 0.242 (H) 0.000 - 0.026 ng/mL   2D echo with color flow doppler    Collection Time: 03/04/17 12:08 PM   Result Value Ref Range    EF 65 55 - 65    Diastolic Dysfunction No     Est. PA Systolic Pressure 28.81     Pericardial Effusion NONE      Current Imaging Results:    Imaging Results         X-Ray Chest 1 View (Final result) Result time:  03/03/17 08:54:04    Final result by Jeet Costello MD (03/03/17 08:54:04)    Impression:      No acute abnormality.      Electronically signed by: JEET COSTELLO  Date:     03/03/17  Time:    08:54     Narrative:    CLINICAL HISTORY:  Chest pain    TECHNIQUE: Single view portable frontal radiograph of the chest    COMPARISON:  N/A      FINDINGS:  Support devices: None    Chest: Cardiac silhouette is borderline enlarged though exaggerated by body habitus and portable technique.  Lungs are well aerated and clear.  No pleural effusion or pneumothorax.    Upper Abdomen: Normal.    Other: N/A.            ECG: NSR. No acute ST T wave changes.    Date of Procedure: 03/04/2017        TEST DESCRIPTION   Technical Quality: This is a technically adequate study.     Aorta: The aortic root is normal in size, measuring 2.5 cm at sinotubular junction and 2.9 cm at Sinuses of Valsalva. The proximal ascending aorta is normal in size, measuring 2.7 cm across.     Left Atrium: The left atrial volume index is normal, measuring 20.78 cc/m2.     Left Ventricle: The left ventricle is small, with an end-diastolic diameter of 2.7 cm, and an end-systolic diameter of 1.6 cm. Wall thickness is upper limit of normal in size, with the septum and the posterior wall each measuring 1.1 cm across. Relative   wall thickness was increased at 0.81, and the LV mass index was 48.8 g/m2 consistent with concentric remodeling. Global left ventricular systolic function appears normal. Visually estimated ejection fraction is 60-65%. The LV Doppler derived stroke   volume equals 69.0 ccs.   The E/e'(lat) is 7, consistent with normal diastolic function.     Right Atrium: The right atrium is normal in size, measuring 4.1 cm in length and 2.8 cm in width in the apical view.     Right Ventricle: The right ventricle is normal in size measuring 2.3 cm at the base in the apical right ventricle-focused view. Global right ventricular systolic function appears normal. The estimated PA systolic pressure is 29 mmHg.     Aortic Valve:  Aortic valve is normal in structure with normal leaflet mobility.     Mitral Valve:  Mitral valve is normal in structure with normal leaflet mobility. The pressure half time is 64 msec. The calculated mitral valve area is 3.44 cm2.     Tricuspid Valve:  Tricuspid  valve is normal in structure with normal leaflet mobility.     Pulmonary Valve:  Pulmonary valve is normal in structure with normal leaflet mobility.     Pericardium: There is no evidence of pericardial effusion.      IVC: IVC is normal in size and collapses > 50% with a sniff, suggesting normal right atrial pressure of 3 mmHg.     Intracavitary: There is no evidence of intracavitary mass or thrombus. There is no evidence of vegetation.     CONCLUSIONS     1 - Small left ventricular enlargement.     2 - Concentric remodeling.     3 - Normal left ventricular systolic function (EF 60-65%).     This document has been electronically    SIGNED BY: Raiza Renee MD On: 03/04/2017 14:17

## 2017-03-04 NOTE — PROGRESS NOTES
Seven run of VT. Assessed patient and vital signs taken. Blood pressure 141/94, heart rate of 75 and saturation levels of 97%. Patient reports slight shortness of breath and pain in the left shoulder.

## 2017-03-04 NOTE — PROGRESS NOTES
Patient lying on sofa resting comfortably with no complaints at this time. Patient states that upon exertion is when she begins to have left shoulder and chest pain. Encouraged patient to rest and to call if she needs assistance to the restroom.  also at the bedside involved in care and very attentive to patient. Will continue to monitor.

## 2017-03-04 NOTE — CONSULTS
[facility]  Cardiology  Consult Note    Patient Name: Ryanne Mccauley  MRN: [id number]  Admission Date: 3/3/2017  Hospital Length of Stay: 0 days  Code Status: Full Code   Attending Provider: Poncho Morales MD   Consulting Provider: Raiza Renee MD  Primary Care Physician: Brenden Neal MD  Principal Problem:NSTEMI (non-ST elevated myocardial infarction)    Patient information was obtained from patient, past medical records and ER records.     Consults  Subjective:     Chief Complaint:  Chest Pain    HPI:   52 yo female with hypertension. She has had frequent headaches since 12/2016 which she thinks mat be related to that her pressure has been high. She began experience mild exertional dyspnea on about 2/20/2017. Still she was able to walk in several parades but was not able to dance. In the morning of 3/3/2017 she felt chest pressure and discomfort in her left shoulder. Mild diaphoresis. She called 911 and received sl NTG and the chest tightness resolved. Presents to the ER and was found to be severely hypertensive. No further CP.    Past Medical History:   Diagnosis Date    Abnormal Pap smear of cervix     Asthma     Hypertension        History reviewed. No pertinent surgical history.    Review of patient's allergies indicates:   Allergen Reactions    Ace inhibitors        No current facility-administered medications on file prior to encounter.      Current Outpatient Prescriptions on File Prior to Encounter   Medication Sig    amlodipine (NORVASC) 5 MG tablet Take 5 mg by mouth every morning.     BENICAR HCT 40-25 mg per tablet Take 1 tablet by mouth every morning.     fluticasone (FLONASE) 50 mcg/actuation nasal spray 1-2 sprays by Nasal route once daily.      Family History     None        Social History Main Topics    Smoking status: Never Smoker    Smokeless tobacco: Not on file    Alcohol use No    Drug use: No    Sexual activity: Yes     Partners: Male      Review of Systems   Constitution: Negative for chills, fever, weakness and malaise/fatigue.   HENT: Negative for headaches and nosebleeds.    Eyes: Negative for double vision, vision loss in left eye and vision loss in right eye.   Cardiovascular: Positive for chest pain and dyspnea on exertion. Negative for claudication, irregular heartbeat, leg swelling, near-syncope, orthopnea, palpitations, paroxysmal nocturnal dyspnea and syncope.   Respiratory: Positive for shortness of breath. Negative for cough, hemoptysis and wheezing.    Endocrine: Negative for cold intolerance and heat intolerance.   Hematologic/Lymphatic: Negative for bleeding problem. Does not bruise/bleed easily.   Skin: Negative for color change and rash.   Musculoskeletal: Positive for joint pain (left shoulder). Negative for back pain, falls, muscle weakness and myalgias.   Gastrointestinal: Negative for heartburn, hematemesis, hematochezia, hemorrhoids, jaundice, melena, nausea and vomiting.   Genitourinary: Negative for dysuria and hematuria.   Neurological: Negative for dizziness, focal weakness, light-headedness, loss of balance, numbness and vertigo.   Psychiatric/Behavioral: Negative for altered mental status, depression and memory loss. The patient is not nervous/anxious.    Allergic/Immunologic: Negative for hives and persistent infections.     Objective:     Vital Signs (Most Recent):  Temp: 97.7 °F (36.5 °C) (03/04/17 1300)  Pulse: 76 (03/04/17 1400)  Resp: 18 (03/04/17 1300)  BP: (!) 125/95 (03/04/17 1300)  SpO2: 99 % (03/04/17 1300) Vital Signs (24h Range):  Temp:  [97.7 °F (36.5 °C)-98.6 °F (37 °C)] 97.7 °F (36.5 °C)  Pulse:  [] 76  Resp:  [18] 18  SpO2:  [95 %-100 %] 99 %  BP: (125-144)/(90-97) 125/95     Weight: 78 kg (172 lb)  Body mass index is 30.47 kg/(m^2).    SpO2: 99 %  O2 Device (Oxygen Therapy): room air      Intake/Output Summary (Last 24 hours) at 03/04/17 6323  Last data filed at 03/03/17 2200   Gross per 24 hour    Intake              150 ml   Output                0 ml   Net              150 ml       Lines/Drains/Airways     Peripheral Intravenous Line                 Peripheral IV - Single Lumen 03/03/17 0835 Right Antecubital 1 day                Physical Exam   Constitutional: She is oriented to person, place, and time. She appears well-developed and well-nourished.  Non-toxic appearance. No distress.   HENT:   Head: Normocephalic and atraumatic.   Nose: Nose normal.   Eyes: Right eye exhibits no discharge. Left eye exhibits no discharge. Right conjunctiva is not injected. Left conjunctiva is not injected. Right pupil is round. Left pupil is round. Pupils are equal.   Neck: Neck supple. No JVD present. Carotid bruit is not present. No thyromegaly present.   Cardiovascular: Normal rate, regular rhythm, S1 normal and S2 normal.   No extrasystoles are present. PMI is not displaced.  Exam reveals gallop and S4.    Pulses:       Radial pulses are 2+ on the right side, and 2+ on the left side.        Femoral pulses are 2+ on the right side, and 2+ on the left side.       Dorsalis pedis pulses are 2+ on the right side, and 2+ on the left side.        Posterior tibial pulses are 2+ on the right side, and 2+ on the left side.   Pulmonary/Chest: Effort normal and breath sounds normal.   Abdominal: Soft. Normal appearance. There is no hepatosplenomegaly. There is no tenderness.   Musculoskeletal:        Right ankle: She exhibits no swelling, no ecchymosis and no deformity.        Left ankle: She exhibits no swelling, no ecchymosis and no deformity.   Lymphadenopathy:        Head (right side): No submandibular adenopathy present.        Head (left side): No submandibular adenopathy present.     She has no cervical adenopathy.   Neurological: She is alert and oriented to person, place, and time. She is not disoriented. No cranial nerve deficit.   Skin: Skin is warm, dry and intact. No rash noted. She is not diaphoretic. No cyanosis.  No pallor. Nails show no clubbing.   Psychiatric: She has a normal mood and affect. Her speech is normal and behavior is normal. Judgment and thought content normal. Cognition and memory are normal.       Current Medications:     amlodipine  10 mg Oral Daily    aspirin  325 mg Oral Daily    atorvastatin  80 mg Oral Daily    enoxaparin  40 mg Subcutaneous Daily    hydrALAZINE  25 mg Oral Q8H    hydrochlorothiazide  25 mg Oral Daily    metoprolol tartrate  50 mg Oral BID    olmesartan  40 mg Oral Daily    pantoprazole  40 mg Oral Daily     Current Laboratory Results:    Recent Results (from the past 24 hour(s))   Troponin I    Collection Time: 03/03/17  6:20 PM   Result Value Ref Range    Troponin I 0.696 (H) 0.000 - 0.026 ng/mL   Troponin I    Collection Time: 03/04/17 12:18 AM   Result Value Ref Range    Troponin I 0.379 (H) 0.000 - 0.026 ng/mL   Basic metabolic panel    Collection Time: 03/04/17  4:44 AM   Result Value Ref Range    Sodium 138 136 - 145 mmol/L    Potassium 3.4 (L) 3.5 - 5.1 mmol/L    Chloride 104 95 - 110 mmol/L    CO2 23 23 - 29 mmol/L    Glucose 117 (H) 70 - 110 mg/dL    BUN, Bld 14 6 - 20 mg/dL    Creatinine 0.9 0.5 - 1.4 mg/dL    Calcium 9.6 8.7 - 10.5 mg/dL    Anion Gap 11 8 - 16 mmol/L    eGFR if African American >60 >60 mL/min/1.73 m^2    eGFR if non African American >60 >60 mL/min/1.73 m^2   Magnesium    Collection Time: 03/04/17  4:44 AM   Result Value Ref Range    Magnesium 2.1 1.6 - 2.6 mg/dL   Phosphorus    Collection Time: 03/04/17  4:44 AM   Result Value Ref Range    Phosphorus 4.1 2.7 - 4.5 mg/dL   CBC auto differential    Collection Time: 03/04/17  4:44 AM   Result Value Ref Range    WBC 7.54 3.90 - 12.70 K/uL    RBC 4.91 4.00 - 5.40 M/uL    Hemoglobin 12.7 12.0 - 16.0 g/dL    Hematocrit 38.0 37.0 - 48.5 %    MCV 77 (L) 82 - 98 fL    MCH 25.9 (L) 27.0 - 31.0 pg    MCHC 33.4 32.0 - 36.0 %    RDW 14.6 (H) 11.5 - 14.5 %    Platelets 322 150 - 350 K/uL    MPV 9.5 9.2 - 12.9 fL     Gran # 3.4 1.8 - 7.7 K/uL    Lymph # 3.4 1.0 - 4.8 K/uL    Mono # 0.5 0.3 - 1.0 K/uL    Eos # 0.4 0.0 - 0.5 K/uL    Baso # 0.01 0.00 - 0.20 K/uL    Gran% 44.4 38.0 - 73.0 %    Lymph% 44.6 18.0 - 48.0 %    Mono% 6.0 4.0 - 15.0 %    Eosinophil% 4.8 0.0 - 8.0 %    Basophil% 0.1 0.0 - 1.9 %    Differential Method Automated    Troponin I    Collection Time: 03/04/17  4:44 AM   Result Value Ref Range    Troponin I 0.242 (H) 0.000 - 0.026 ng/mL   2D echo with color flow doppler    Collection Time: 03/04/17 12:08 PM   Result Value Ref Range    EF 65 55 - 65    Diastolic Dysfunction No     Est. PA Systolic Pressure 28.81     Pericardial Effusion NONE      Current Imaging Results:    Imaging Results         X-Ray Chest 1 View (Final result) Result time:  03/03/17 08:54:04    Final result by Jeet Costello MD (03/03/17 08:54:04)    Impression:      No acute abnormality.      Electronically signed by: JEET COSTELLO  Date:     03/03/17  Time:    08:54     Narrative:    CLINICAL HISTORY:  Chest pain    TECHNIQUE: Single view portable frontal radiograph of the chest    COMPARISON: N/A      FINDINGS:  Support devices: None    Chest: Cardiac silhouette is borderline enlarged though exaggerated by body habitus and portable technique.  Lungs are well aerated and clear.  No pleural effusion or pneumothorax.    Upper Abdomen: Normal.    Other: N/A.            ECG: NSR. No acute ST T wave changes.    Date of Procedure: 03/04/2017        TEST DESCRIPTION   Technical Quality: This is a technically adequate study.     Aorta: The aortic root is normal in size, measuring 2.5 cm at sinotubular junction and 2.9 cm at Sinuses of Valsalva. The proximal ascending aorta is normal in size, measuring 2.7 cm across.     Left Atrium: The left atrial volume index is normal, measuring 20.78 cc/m2.     Left Ventricle: The left ventricle is small, with an end-diastolic diameter of 2.7 cm, and an end-systolic diameter of 1.6 cm. Wall thickness is upper limit  of normal in size, with the septum and the posterior wall each measuring 1.1 cm across. Relative   wall thickness was increased at 0.81, and the LV mass index was 48.8 g/m2 consistent with concentric remodeling. Global left ventricular systolic function appears normal. Visually estimated ejection fraction is 60-65%. The LV Doppler derived stroke   volume equals 69.0 ccs.   The E/e'(lat) is 7, consistent with normal diastolic function.     Right Atrium: The right atrium is normal in size, measuring 4.1 cm in length and 2.8 cm in width in the apical view.     Right Ventricle: The right ventricle is normal in size measuring 2.3 cm at the base in the apical right ventricle-focused view. Global right ventricular systolic function appears normal. The estimated PA systolic pressure is 29 mmHg.     Aortic Valve:  Aortic valve is normal in structure with normal leaflet mobility.     Mitral Valve:  Mitral valve is normal in structure with normal leaflet mobility. The pressure half time is 64 msec. The calculated mitral valve area is 3.44 cm2.     Tricuspid Valve:  Tricuspid valve is normal in structure with normal leaflet mobility.     Pulmonary Valve:  Pulmonary valve is normal in structure with normal leaflet mobility.     Pericardium: There is no evidence of pericardial effusion.      IVC: IVC is normal in size and collapses > 50% with a sniff, suggesting normal right atrial pressure of 3 mmHg.     Intracavitary: There is no evidence of intracavitary mass or thrombus. There is no evidence of vegetation.     CONCLUSIONS     1 - Small left ventricular enlargement.     2 - Concentric remodeling.     3 - Normal left ventricular systolic function (EF 60-65%).     This document has been electronically    SIGNED BY: Raiza Renee MD On: 03/04/2017 14:17    Assessment and Plan:     1. NSTEMI   Probably primary NSTEMI but could be due to severe hypertension   No acute ST-T wave changes. Troponin 0.6.   3/4/2017: Small left  ventricle with normal systolic function.   3/6/2017: Plan cath.   Aspirin, clopidogrel and low dose enoxaparin.   On metoprolol.   NTG sl PRN.    2. Ventricular Tachycardia   3/3/2017: Short nonsustained rum   On metoprolol.    3. Hypertension   2005: Diagnosed.   On olmesartan 40 mg Q24, hctz 25 mg Q24, amlodipine 10 mg Q24 and metoprolol 50 mg Q12.    4. Hypercholesterolemia    The planned procedure was discussed in detail with the patient and the family members present. Risk, benefit and alternatives were reviewed. All questions answered. Consent was then signed.    If further questions or concerns arise the patient was encouraged to contact me prior to the planned procedure.            VTE Risk Mitigation         Ordered     enoxaparin injection 40 mg  Daily     Route:  Subcutaneous        03/03/17 1205     Medium Risk of VTE  Once      03/03/17 1205          Thank you for your consult.    I will follow-up with patient. Please contact us if you have any additional questions.    Raiza Renee MD  Cardiology   [facility]

## 2017-03-05 LAB
ANION GAP SERPL CALC-SCNC: 11 MMOL/L
BASOPHILS # BLD AUTO: 0.02 K/UL
BASOPHILS NFR BLD: 0.3 %
BUN SERPL-MCNC: 17 MG/DL
CALCIUM SERPL-MCNC: 10.5 MG/DL
CHLORIDE SERPL-SCNC: 103 MMOL/L
CHOLEST/HDLC SERPL: 3.5 {RATIO}
CO2 SERPL-SCNC: 27 MMOL/L
CREAT SERPL-MCNC: 1.1 MG/DL
DIFFERENTIAL METHOD: ABNORMAL
EOSINOPHIL # BLD AUTO: 0.3 K/UL
EOSINOPHIL NFR BLD: 4.1 %
ERYTHROCYTE [DISTWIDTH] IN BLOOD BY AUTOMATED COUNT: 14.6 %
EST. GFR  (AFRICAN AMERICAN): >60 ML/MIN/1.73 M^2
EST. GFR  (NON AFRICAN AMERICAN): 58 ML/MIN/1.73 M^2
GLUCOSE SERPL-MCNC: 105 MG/DL
HCT VFR BLD AUTO: 40 %
HDL/CHOLESTEROL RATIO: 28.9 %
HDLC SERPL-MCNC: 173 MG/DL
HDLC SERPL-MCNC: 50 MG/DL
HGB BLD-MCNC: 13.5 G/DL
LDLC SERPL CALC-MCNC: 103.4 MG/DL
LYMPHOCYTES # BLD AUTO: 3.1 K/UL
LYMPHOCYTES NFR BLD: 38.7 %
MAGNESIUM SERPL-MCNC: 2.2 MG/DL
MCH RBC QN AUTO: 26 PG
MCHC RBC AUTO-ENTMCNC: 33.8 %
MCV RBC AUTO: 77 FL
MONOCYTES # BLD AUTO: 0.5 K/UL
MONOCYTES NFR BLD: 6.5 %
NEUTROPHILS # BLD AUTO: 4 K/UL
NEUTROPHILS NFR BLD: 50.3 %
NONHDLC SERPL-MCNC: 123 MG/DL
PHOSPHATE SERPL-MCNC: 4.8 MG/DL
PLATELET # BLD AUTO: 348 K/UL
PMV BLD AUTO: 9.6 FL
POTASSIUM SERPL-SCNC: 4.4 MMOL/L
RBC # BLD AUTO: 5.19 M/UL
SODIUM SERPL-SCNC: 141 MMOL/L
TRIGL SERPL-MCNC: 98 MG/DL
WBC # BLD AUTO: 7.88 K/UL

## 2017-03-05 PROCEDURE — 99232 SBSQ HOSP IP/OBS MODERATE 35: CPT | Mod: ,,, | Performed by: HOSPITALIST

## 2017-03-05 PROCEDURE — 36415 COLL VENOUS BLD VENIPUNCTURE: CPT

## 2017-03-05 PROCEDURE — 80061 LIPID PANEL: CPT

## 2017-03-05 PROCEDURE — 85025 COMPLETE CBC W/AUTO DIFF WBC: CPT

## 2017-03-05 PROCEDURE — 25000003 PHARM REV CODE 250: Performed by: HOSPITALIST

## 2017-03-05 PROCEDURE — 25000003 PHARM REV CODE 250

## 2017-03-05 PROCEDURE — 80048 BASIC METABOLIC PNL TOTAL CA: CPT

## 2017-03-05 PROCEDURE — 63600175 PHARM REV CODE 636 W HCPCS

## 2017-03-05 PROCEDURE — 11000001 HC ACUTE MED/SURG PRIVATE ROOM

## 2017-03-05 PROCEDURE — 25000003 PHARM REV CODE 250: Performed by: INTERNAL MEDICINE

## 2017-03-05 PROCEDURE — 83735 ASSAY OF MAGNESIUM: CPT

## 2017-03-05 PROCEDURE — 84100 ASSAY OF PHOSPHORUS: CPT

## 2017-03-05 RX ORDER — SODIUM CHLORIDE 9 MG/ML
INJECTION, SOLUTION INTRAVENOUS CONTINUOUS
Status: DISCONTINUED | OUTPATIENT
Start: 2017-03-05 | End: 2017-03-05

## 2017-03-05 RX ADMIN — METOPROLOL TARTRATE 50 MG: 50 TABLET ORAL at 09:03

## 2017-03-05 RX ADMIN — PANTOPRAZOLE SODIUM 40 MG: 40 TABLET, DELAYED RELEASE ORAL at 09:03

## 2017-03-05 RX ADMIN — ATORVASTATIN CALCIUM 80 MG: 20 TABLET, FILM COATED ORAL at 09:03

## 2017-03-05 RX ADMIN — ASPIRIN 81 MG: 81 TABLET, COATED ORAL at 09:03

## 2017-03-05 RX ADMIN — HYDRALAZINE HYDROCHLORIDE 25 MG: 25 TABLET, FILM COATED ORAL at 05:03

## 2017-03-05 RX ADMIN — CLOPIDOGREL 75 MG: 75 TABLET, FILM COATED ORAL at 09:03

## 2017-03-05 RX ADMIN — SODIUM CHLORIDE: 0.9 INJECTION, SOLUTION INTRAVENOUS at 11:03

## 2017-03-05 RX ADMIN — HYDRALAZINE HYDROCHLORIDE 25 MG: 25 TABLET, FILM COATED ORAL at 09:03

## 2017-03-05 RX ADMIN — OLMESARTAN MEDOXOMIL 40 MG: 20 TABLET, FILM COATED ORAL at 09:03

## 2017-03-05 RX ADMIN — AMLODIPINE BESYLATE 10 MG: 5 TABLET ORAL at 09:03

## 2017-03-05 RX ADMIN — ENOXAPARIN SODIUM 40 MG: 100 INJECTION SUBCUTANEOUS at 01:03

## 2017-03-05 RX ADMIN — HYDRALAZINE HYDROCHLORIDE 25 MG: 25 TABLET, FILM COATED ORAL at 01:03

## 2017-03-05 NOTE — PROGRESS NOTES
Patient encouraged to rest comfortably due to health status. IV repositioned; tele monitoring continued. Family at the bedside and involved in care. Patient free from falls with bed in low position, wheels locked, and call light in reach. Will continue to monitor.

## 2017-03-05 NOTE — PROGRESS NOTES
Progress Note  [facility] Medicine      Admit Date: 3/3/2017 (LOS: 1)    SUBJECTIVE:     Follow-up For:  NSTEMI (non-ST elevated myocardial infarction)    HPI/Interval history: No acute events.    Review of Systems: No fevers, chills.  No nausea, vomiting, or diarrhea.    Medications: Reviewed and documented in the MAR.    OBJECTIVE:     Vital Signs (Most Recent)  Temp: 98.2 °F (36.8 °C) (03/05/17 0400)  Pulse: 69 (03/05/17 0600)  Resp: 18 (03/05/17 0400)  BP: (!) 102/56 (03/05/17 0400)  SpO2: 96 % (03/05/17 0400)    Vital Signs Range (Last 24H):  Temp:  [97.7 °F (36.5 °C)-98.2 °F (36.8 °C)]   Pulse:  [61-96]   Resp:  [18]   BP: (102-133)/(56-97)   SpO2:  [94 %-99 %]     I & O (Last 24H):    Intake/Output Summary (Last 24 hours) at 03/05/17 0639  Last data filed at 03/04/17 2300   Gross per 24 hour   Intake              150 ml   Output                0 ml   Net              150 ml       BMI: Body mass index is 30.47 kg/(m^2).    Physical Exam:  General: Patient is awake, alert, and oriented, and in no acute distress.  Eyes: Pupils equal, round, and reactive to light, anicteric sclera.  Mouth: No lesions, moist mucous membranes.  Respiratory: Lungs clear to ausculation, no crackles or wheezing.  Cardiovascular: Regular rate and rythmn, no murmurs appreciated.  Abdomen: Soft, non-tender, non-distended, normal bowel sounds.  Skin: No rashes or breakdown.  Extremities: No edema, cyanosis, or clubbing.  Neuro: No focal weakness.    Diagnostic Results:  CBC:    Recent Labs  Lab 03/03/17  0840 03/04/17 0444 03/05/17  0430   WBC 6.96 7.54 7.88   HGB 12.3 12.7 13.5   HCT 37.0 38.0 40.0    322 348   MCV 78* 77* 77*     BMP:    Recent Labs  Lab 03/03/17  0840 03/04/17  0444 03/05/17  0430   * 117* 105    138 141   K 3.6 3.4* 4.4    104 103   CO2 28 23 27   BUN 11 14 17   CREATININE 0.8 0.9 1.1   CALCIUM 9.2 9.6 10.5   MG 2.1 2.1 2.2   PHOS  --  4.1 4.8*       ASSESSMENT/PLAN:     Active Hospital  Problems    Diagnosis  POA    *NSTEMI (non-ST elevated myocardial infarction) [I21.4]  Yes    Acute chest pain [R07.9]  Yes    Essential hypertension [I10]  Yes    Mild intermittent asthma without complication [J45.20]  Yes    ACS (acute coronary syndrome) [I24.9]  Yes    Chest pain on breathing [R07.1]  Yes      Resolved Hospital Problems    Diagnosis Date Resolved POA   No resolved problems to display.       Non-ST elevation myocardial infraction.  Continue with medical therapy as per Dr. Raiza Renee.  Echocardiogram shows normal systolic function (ejection fraction 60-65 percent).  Plan for left heart catheterization and coronary angiography tomorrow.     Hypertension.  Better controlled with current regimen.  Will continue except for diuretic today to avoid hypovolemia due to anticipated contrast load.  Will also give 1000 cc normal saline today with goal of protecting her kidneys.     Asthma. Stable.     DVT prophylaxis.  Subcutaneous enoxaparin.

## 2017-03-05 NOTE — PROGRESS NOTES
[facility], [place], operated by [facility]  Cardiology  Progress Note    Patient Name: Ryanne Mccauley  MRN: [id number]  Admission Date: 3/3/2017  Hospital Length of Stay: 1 days  Code Status: Full Code   Attending Physician: Poncho Morales MD   Primary Care Physician: Brenden Neal MD  Expected Discharge Date:   Principal Problem:NSTEMI (non-ST elevated myocardial infarction)    Subjective:     Brief HPI:    50 yo female with hypertension. She has had frequent headaches since 12/2016 which she thinks may have been related to that her pressure has been high. She began experience mild exertional dyspnea on about 2/20/2017. Still she was able to walk in several parades but was not able to dance. In the morning of 3/3/2017 she felt chest pressure and discomfort in her left shoulder. Mild diaphoresis. She called 911 and received sl NTG and the chest tightness resolved. Presents to the ER and was found to be severely hypertensive. No further CP.    Hospital Course:     No further CP.    Interval History:     No CP or SOB.    Review of Systems   Constitution: Negative for chills, fever, weakness and malaise/fatigue.   HENT: Negative for headaches and nosebleeds.    Eyes: Negative for double vision, vision loss in left eye and vision loss in right eye.   Cardiovascular: Negative for chest pain, claudication, dyspnea on exertion, irregular heartbeat, leg swelling, near-syncope, orthopnea, palpitations, paroxysmal nocturnal dyspnea and syncope.   Respiratory: Negative for cough, hemoptysis, shortness of breath and wheezing.    Endocrine: Negative for cold intolerance and heat intolerance.   Hematologic/Lymphatic: Negative for bleeding problem. Does not bruise/bleed easily.   Skin: Negative for color change and rash.   Musculoskeletal: Negative for back pain, falls, muscle weakness and myalgias.   Gastrointestinal: Negative for heartburn, hematemesis, hematochezia, hemorrhoids, jaundice, melena, nausea and vomiting.    Genitourinary: Negative for dysuria and hematuria.   Neurological: Negative for dizziness, focal weakness, light-headedness, loss of balance, numbness and vertigo.   Psychiatric/Behavioral: Negative for altered mental status, depression and memory loss. The patient is not nervous/anxious.    Allergic/Immunologic: Negative for hives and persistent infections.     Objective:     Vital Signs (Most Recent):  Temp: 97.5 °F (36.4 °C) (03/05/17 0805)  Pulse: 87 (03/05/17 1400)  Resp: 18 (03/05/17 0805)  BP: 132/84 (03/05/17 0805)  SpO2: 98 % (03/05/17 0805) Vital Signs (24h Range):  Temp:  [97.5 °F (36.4 °C)-98.2 °F (36.8 °C)] 97.5 °F (36.4 °C)  Pulse:  [61-96] 87  Resp:  [18] 18  SpO2:  [94 %-98 %] 98 %  BP: (102-133)/(56-90) 132/84     Weight: 78 kg (172 lb)  Body mass index is 30.47 kg/(m^2).    SpO2: 98 %  O2 Device (Oxygen Therapy): room air      Intake/Output Summary (Last 24 hours) at 03/05/17 1435  Last data filed at 03/04/17 2300   Gross per 24 hour   Intake              150 ml   Output                0 ml   Net              150 ml       Lines/Drains/Airways     Peripheral Intravenous Line                 Peripheral IV - Single Lumen 03/04/17 1810 Right Hand less than 1 day                Physical Exam   Constitutional: She is oriented to person, place, and time. She appears well-developed and well-nourished.  Non-toxic appearance. No distress.   HENT:   Head: Normocephalic and atraumatic.   Nose: Nose normal.   Eyes: Right eye exhibits no discharge. Left eye exhibits no discharge. Right conjunctiva is not injected. Left conjunctiva is not injected. Right pupil is round. Left pupil is round. Pupils are equal.   Neck: Neck supple. No JVD present. Carotid bruit is not present. No thyromegaly present.   Cardiovascular: Normal rate, regular rhythm, S1 normal and S2 normal.   No extrasystoles are present. PMI is not displaced.  Exam reveals gallop and S4.    Pulses:       Radial pulses are 2+ on the right side, and  2+ on the left side.        Femoral pulses are 2+ on the right side, and 2+ on the left side.       Dorsalis pedis pulses are 2+ on the right side, and 2+ on the left side.        Posterior tibial pulses are 2+ on the right side, and 2+ on the left side.   Pulmonary/Chest: Effort normal and breath sounds normal.   Abdominal: Soft. Normal appearance. There is no hepatosplenomegaly. There is no tenderness.   Musculoskeletal:        Right ankle: She exhibits no swelling, no ecchymosis and no deformity.        Left ankle: She exhibits no swelling, no ecchymosis and no deformity.   Lymphadenopathy:        Head (right side): No submandibular adenopathy present.        Head (left side): No submandibular adenopathy present.     She has no cervical adenopathy.   Neurological: She is alert and oriented to person, place, and time. She is not disoriented. No cranial nerve deficit.   Skin: Skin is warm, dry and intact. No rash noted. She is not diaphoretic. No cyanosis. No pallor. Nails show no clubbing.   Psychiatric: She has a normal mood and affect. Her speech is normal and behavior is normal. Judgment and thought content normal. Cognition and memory are normal.       Current Medications:     amlodipine  10 mg Oral Daily    aspirin  81 mg Oral Daily    atorvastatin  80 mg Oral Daily    clopidogrel  75 mg Oral Daily    enoxaparin  40 mg Subcutaneous Daily    hydrALAZINE  25 mg Oral Q8H    metoprolol tartrate  50 mg Oral BID    olmesartan  40 mg Oral Daily    pantoprazole  40 mg Oral Daily     Current Laboratory Results:    Recent Results (from the past 24 hour(s))   Basic metabolic panel    Collection Time: 03/05/17  4:30 AM   Result Value Ref Range    Sodium 141 136 - 145 mmol/L    Potassium 4.4 3.5 - 5.1 mmol/L    Chloride 103 95 - 110 mmol/L    CO2 27 23 - 29 mmol/L    Glucose 105 70 - 110 mg/dL    BUN, Bld 17 6 - 20 mg/dL    Creatinine 1.1 0.5 - 1.4 mg/dL    Calcium 10.5 8.7 - 10.5 mg/dL    Anion Gap 11 8 - 16  mmol/L    eGFR if African American >60 >60 mL/min/1.73 m^2    eGFR if non African American 58 (A) >60 mL/min/1.73 m^2   Magnesium    Collection Time: 03/05/17  4:30 AM   Result Value Ref Range    Magnesium 2.2 1.6 - 2.6 mg/dL   Phosphorus    Collection Time: 03/05/17  4:30 AM   Result Value Ref Range    Phosphorus 4.8 (H) 2.7 - 4.5 mg/dL   CBC auto differential    Collection Time: 03/05/17  4:30 AM   Result Value Ref Range    WBC 7.88 3.90 - 12.70 K/uL    RBC 5.19 4.00 - 5.40 M/uL    Hemoglobin 13.5 12.0 - 16.0 g/dL    Hematocrit 40.0 37.0 - 48.5 %    MCV 77 (L) 82 - 98 fL    MCH 26.0 (L) 27.0 - 31.0 pg    MCHC 33.8 32.0 - 36.0 %    RDW 14.6 (H) 11.5 - 14.5 %    Platelets 348 150 - 350 K/uL    MPV 9.6 9.2 - 12.9 fL    Gran # 4.0 1.8 - 7.7 K/uL    Lymph # 3.1 1.0 - 4.8 K/uL    Mono # 0.5 0.3 - 1.0 K/uL    Eos # 0.3 0.0 - 0.5 K/uL    Baso # 0.02 0.00 - 0.20 K/uL    Gran% 50.3 38.0 - 73.0 %    Lymph% 38.7 18.0 - 48.0 %    Mono% 6.5 4.0 - 15.0 %    Eosinophil% 4.1 0.0 - 8.0 %    Basophil% 0.3 0.0 - 1.9 %    Differential Method Automated    Lipid panel    Collection Time: 03/05/17  4:30 AM   Result Value Ref Range    Cholesterol 173 120 - 199 mg/dL    Triglycerides 98 30 - 150 mg/dL    HDL 50 40 - 75 mg/dL    LDL Cholesterol 103.4 63.0 - 159.0 mg/dL    HDL/Chol Ratio 28.9 20.0 - 50.0 %    Total Cholesterol/HDL Ratio 3.5 2.0 - 5.0    Non-HDL Cholesterol 123 mg/dL     Current Imaging Results:    Imaging Results         X-Ray Chest 1 View (Final result) Result time:  03/03/17 08:54:04    Final result by Jeet Costello MD (03/03/17 08:54:04)    Impression:      No acute abnormality.      Electronically signed by: JEET COSTELLO  Date:     03/03/17  Time:    08:54     Narrative:    CLINICAL HISTORY:  Chest pain    TECHNIQUE: Single view portable frontal radiograph of the chest    COMPARISON: N/A      FINDINGS:  Support devices: None    Chest: Cardiac silhouette is borderline enlarged though exaggerated by body habitus and portable  technique.  Lungs are well aerated and clear.  No pleural effusion or pneumothorax.    Upper Abdomen: Normal.    Other: N/A.              Assessment and Plan:     Problem List:    Active Diagnoses:    Diagnosis Date Noted POA    PRINCIPAL PROBLEM:  NSTEMI (non-ST elevated myocardial infarction) [I21.4] 03/04/2017 Yes    Acute chest pain [R07.9] 03/03/2017 Yes    Essential hypertension [I10] 03/03/2017 Yes    Mild intermittent asthma without complication [J45.20] 03/03/2017 Yes    ACS (acute coronary syndrome) [I24.9] 03/03/2017 Yes    Chest pain on breathing [R07.1] 03/03/2017 Yes      Problems Resolved During this Admission:    Diagnosis Date Noted Date Resolved POA     Assessment and Plan:    1. NSTEMI              Probably primary NSTEMI but could be due to severe hypertension              No acute ST-T wave changes. Troponin 0.6.              3/4/2017: Small left ventricle with normal systolic function.              3/6/2017: Plan cath.              Aspirin, clopidogrel and low dose enoxaparin.              On metoprolol.              NTG sl PRN.     2. Ventricular Tachycardia              3/3/2017: Short nonsustained rum              On metoprolol.     3. Hypertension              2005: Diagnosed.              On olmesartan 40 mg Q24, hctz 25 mg Q24, amlodipine 10 mg Q24 and metoprolol 50 mg Q12.     4. Hypercholesterolemia   On atorvastatin 80 mg Q24   3/5/2017: Chol 173. HDL 50. . TG 98.      VTE Risk Mitigation         Ordered     enoxaparin injection 40 mg  Daily     Route:  Subcutaneous        03/03/17 1205     Medium Risk of VTE  Once      03/03/17 1205          Anticipated Disposition: Home or Self Care    Discharge Needs: None unusual planned.    Raiza Renee MD  Cardiology  [facility]

## 2017-03-06 ENCOUNTER — SURGERY (OUTPATIENT)
Age: 52
End: 2017-03-06

## 2017-03-06 VITALS
WEIGHT: 172 LBS | BODY MASS INDEX: 30.48 KG/M2 | SYSTOLIC BLOOD PRESSURE: 132 MMHG | HEIGHT: 63 IN | OXYGEN SATURATION: 100 % | HEART RATE: 91 BPM | DIASTOLIC BLOOD PRESSURE: 71 MMHG | TEMPERATURE: 98 F | RESPIRATION RATE: 16 BRPM

## 2017-03-06 PROBLEM — R07.1 CHEST PAIN ON BREATHING: Status: RESOLVED | Noted: 2017-03-03 | Resolved: 2017-03-06

## 2017-03-06 PROBLEM — I24.9 ACS (ACUTE CORONARY SYNDROME): Status: RESOLVED | Noted: 2017-03-03 | Resolved: 2017-03-06

## 2017-03-06 PROBLEM — I24.9 ACS (ACUTE CORONARY SYNDROME): Status: ACTIVE | Noted: 2017-03-06

## 2017-03-06 PROBLEM — I25.10 CORONARY ARTERY DISEASE: Status: ACTIVE | Noted: 2017-03-06

## 2017-03-06 PROBLEM — R07.9 ACUTE CHEST PAIN: Status: RESOLVED | Noted: 2017-03-03 | Resolved: 2017-03-06

## 2017-03-06 LAB
ANION GAP SERPL CALC-SCNC: 9 MMOL/L
BASOPHILS # BLD AUTO: 0.02 K/UL
BASOPHILS NFR BLD: 0.3 %
BUN SERPL-MCNC: 18 MG/DL
CALCIUM SERPL-MCNC: 9.5 MG/DL
CHLORIDE SERPL-SCNC: 108 MMOL/L
CO2 SERPL-SCNC: 24 MMOL/L
CREAT SERPL-MCNC: 0.8 MG/DL
DIFFERENTIAL METHOD: ABNORMAL
EOSINOPHIL # BLD AUTO: 0.2 K/UL
EOSINOPHIL NFR BLD: 3.3 %
ERYTHROCYTE [DISTWIDTH] IN BLOOD BY AUTOMATED COUNT: 14.8 %
EST. GFR  (AFRICAN AMERICAN): >60 ML/MIN/1.73 M^2
EST. GFR  (NON AFRICAN AMERICAN): >60 ML/MIN/1.73 M^2
GLUCOSE SERPL-MCNC: 99 MG/DL
HCT VFR BLD AUTO: 37.8 %
HGB BLD-MCNC: 12.7 G/DL
LYMPHOCYTES # BLD AUTO: 3.7 K/UL
LYMPHOCYTES NFR BLD: 51.6 %
MAGNESIUM SERPL-MCNC: 2.1 MG/DL
MCH RBC QN AUTO: 25.9 PG
MCHC RBC AUTO-ENTMCNC: 33.6 %
MCV RBC AUTO: 77 FL
MONOCYTES # BLD AUTO: 0.4 K/UL
MONOCYTES NFR BLD: 5 %
NEUTROPHILS # BLD AUTO: 2.9 K/UL
NEUTROPHILS NFR BLD: 39.7 %
PHOSPHATE SERPL-MCNC: 4 MG/DL
PLATELET # BLD AUTO: 321 K/UL
PMV BLD AUTO: 9.6 FL
POTASSIUM SERPL-SCNC: 3.8 MMOL/L
RBC # BLD AUTO: 4.91 M/UL
SODIUM SERPL-SCNC: 141 MMOL/L
WBC # BLD AUTO: 7.23 K/UL

## 2017-03-06 PROCEDURE — 84100 ASSAY OF PHOSPHORUS: CPT

## 2017-03-06 PROCEDURE — 36415 COLL VENOUS BLD VENIPUNCTURE: CPT

## 2017-03-06 PROCEDURE — 63600175 PHARM REV CODE 636 W HCPCS

## 2017-03-06 PROCEDURE — B2111ZZ FLUOROSCOPY OF MULTIPLE CORONARY ARTERIES USING LOW OSMOLAR CONTRAST: ICD-10-PCS | Performed by: INTERNAL MEDICINE

## 2017-03-06 PROCEDURE — 25000003 PHARM REV CODE 250

## 2017-03-06 PROCEDURE — 99239 HOSP IP/OBS DSCHRG MGMT >30: CPT | Mod: ,,, | Performed by: HOSPITALIST

## 2017-03-06 PROCEDURE — 25500020 PHARM REV CODE 255

## 2017-03-06 PROCEDURE — B2151ZZ FLUOROSCOPY OF LEFT HEART USING LOW OSMOLAR CONTRAST: ICD-10-PCS | Performed by: INTERNAL MEDICINE

## 2017-03-06 PROCEDURE — 27201224 CATH LAB PROCEDURE

## 2017-03-06 PROCEDURE — 4A023N7 MEASUREMENT OF CARDIAC SAMPLING AND PRESSURE, LEFT HEART, PERCUTANEOUS APPROACH: ICD-10-PCS | Performed by: INTERNAL MEDICINE

## 2017-03-06 PROCEDURE — 25000003 PHARM REV CODE 250: Performed by: INTERNAL MEDICINE

## 2017-03-06 PROCEDURE — 85025 COMPLETE CBC W/AUTO DIFF WBC: CPT

## 2017-03-06 PROCEDURE — 25000003 PHARM REV CODE 250: Performed by: HOSPITALIST

## 2017-03-06 PROCEDURE — 80048 BASIC METABOLIC PNL TOTAL CA: CPT

## 2017-03-06 PROCEDURE — 83735 ASSAY OF MAGNESIUM: CPT

## 2017-03-06 RX ORDER — CARVEDILOL 12.5 MG/1
12.5 TABLET ORAL ONCE
Status: COMPLETED | OUTPATIENT
Start: 2017-03-06 | End: 2017-03-06

## 2017-03-06 RX ORDER — CARVEDILOL 12.5 MG/1
12.5 TABLET ORAL 2 TIMES DAILY WITH MEALS
Qty: 60 TABLET | Refills: 0 | Status: SHIPPED | OUTPATIENT
Start: 2017-03-06 | End: 2017-03-28

## 2017-03-06 RX ORDER — AMLODIPINE BESYLATE 10 MG/1
10 TABLET ORAL DAILY
Qty: 30 TABLET | Refills: 0 | Status: SHIPPED | OUTPATIENT
Start: 2017-03-06 | End: 2017-03-28

## 2017-03-06 RX ORDER — CARVEDILOL 12.5 MG/1
12.5 TABLET ORAL 2 TIMES DAILY
Status: CANCELLED | OUTPATIENT
Start: 2017-03-06

## 2017-03-06 RX ORDER — SODIUM CHLORIDE 9 MG/ML
INJECTION, SOLUTION INTRAVENOUS CONTINUOUS
Status: ACTIVE | OUTPATIENT
Start: 2017-03-06 | End: 2017-03-06

## 2017-03-06 RX ORDER — DIPHENHYDRAMINE HCL 25 MG
25 CAPSULE ORAL
Status: DISCONTINUED | OUTPATIENT
Start: 2017-03-06 | End: 2017-03-06 | Stop reason: HOSPADM

## 2017-03-06 RX ORDER — HYDROCHLOROTHIAZIDE 12.5 MG/1
12.5 TABLET ORAL DAILY
Status: CANCELLED | OUTPATIENT
Start: 2017-03-07

## 2017-03-06 RX ORDER — HYDROCODONE BITARTRATE AND ACETAMINOPHEN 5; 325 MG/1; MG/1
1 TABLET ORAL EVERY 4 HOURS PRN
Status: DISCONTINUED | OUTPATIENT
Start: 2017-03-06 | End: 2017-03-06 | Stop reason: HOSPADM

## 2017-03-06 RX ORDER — ACETAMINOPHEN 325 MG/1
650 TABLET ORAL EVERY 4 HOURS PRN
Status: DISCONTINUED | OUTPATIENT
Start: 2017-03-06 | End: 2017-03-06 | Stop reason: HOSPADM

## 2017-03-06 RX ORDER — ALBUTEROL SULFATE 90 UG/1
2 AEROSOL, METERED RESPIRATORY (INHALATION) EVERY 6 HOURS PRN
Qty: 18 G | Refills: 0 | Status: SHIPPED | OUTPATIENT
Start: 2017-03-06

## 2017-03-06 RX ORDER — ATORVASTATIN CALCIUM 80 MG/1
40 TABLET, FILM COATED ORAL NIGHTLY
Qty: 15 TABLET | Refills: 0 | Status: SHIPPED | OUTPATIENT
Start: 2017-03-06 | End: 2017-03-16

## 2017-03-06 RX ORDER — ASPIRIN 81 MG/1
81 TABLET ORAL DAILY
Qty: 30 TABLET | Refills: 0 | Status: SHIPPED | OUTPATIENT
Start: 2017-03-06 | End: 2017-04-03 | Stop reason: SDUPTHER

## 2017-03-06 RX ORDER — CLOPIDOGREL BISULFATE 75 MG/1
75 TABLET ORAL DAILY
Qty: 30 TABLET | Refills: 0 | Status: SHIPPED | OUTPATIENT
Start: 2017-03-06 | End: 2017-04-03 | Stop reason: SDUPTHER

## 2017-03-06 RX ORDER — SODIUM CHLORIDE 9 MG/ML
INJECTION, SOLUTION INTRAVENOUS CONTINUOUS
Status: DISCONTINUED | OUTPATIENT
Start: 2017-03-06 | End: 2017-03-06 | Stop reason: HOSPADM

## 2017-03-06 RX ADMIN — PANTOPRAZOLE SODIUM 40 MG: 40 TABLET, DELAYED RELEASE ORAL at 03:03

## 2017-03-06 RX ADMIN — ASPIRIN 81 MG: 81 TABLET, COATED ORAL at 03:03

## 2017-03-06 RX ADMIN — SODIUM CHLORIDE: 0.9 INJECTION, SOLUTION INTRAVENOUS at 11:03

## 2017-03-06 RX ADMIN — OLMESARTAN MEDOXOMIL 40 MG: 20 TABLET, FILM COATED ORAL at 03:03

## 2017-03-06 RX ADMIN — HYDROCODONE BITARTRATE AND ACETAMINOPHEN 1 TABLET: 5; 325 TABLET ORAL at 09:03

## 2017-03-06 RX ADMIN — SODIUM CHLORIDE: 0.9 INJECTION, SOLUTION INTRAVENOUS at 05:03

## 2017-03-06 RX ADMIN — AMLODIPINE BESYLATE 10 MG: 5 TABLET ORAL at 03:03

## 2017-03-06 RX ADMIN — CLOPIDOGREL 75 MG: 75 TABLET, FILM COATED ORAL at 03:03

## 2017-03-06 RX ADMIN — CARVEDILOL 12.5 MG: 12.5 TABLET, FILM COATED ORAL at 04:03

## 2017-03-06 RX ADMIN — ATORVASTATIN CALCIUM 80 MG: 20 TABLET, FILM COATED ORAL at 03:03

## 2017-03-06 NOTE — PROGRESS NOTES
[facility]  Cardiology  Progress Note    Patient Name: Ryanne Mccauley  MRN: [id number]  Admission Date: 3/3/2017  Hospital Length of Stay: 2 days  Code Status: Full Code   Attending Physician: Poncho Morales MD   Primary Care Physician: Brenden Neal MD  Expected Discharge Date:   Principal Problem:Non-ST elevation myocardial infarction (NSTEMI)    Subjective:     Brief HPI:    50 yo female with hypertension. She has had frequent headaches since 12/2016 which she thinks may have been related to that her pressure has been high. She began experience mild exertional dyspnea on about 2/20/2017. Still she was able to walk in several parades but was not able to dance. In the morning of 3/3/2017 she felt chest pressure and discomfort in her left shoulder. Mild diaphoresis. She called 911 and received sl NTG and the chest tightness resolved. Presents to the ER and was found to be severely hypertensive. No further CP.    Hospital Course:     No further CP.    3/6/2017: [facility] [place]: Cath: LAD: Mid: Moderate diffuse disease of 60%. LV: Normal function.    Interval History:     No CP or SOB.    Review of Systems   Constitution: Negative for chills, fever, weakness and malaise/fatigue.   HENT: Negative for headaches and nosebleeds.    Eyes: Negative for double vision, vision loss in left eye and vision loss in right eye.   Cardiovascular: Negative for chest pain, claudication, dyspnea on exertion, irregular heartbeat, leg swelling, near-syncope, orthopnea, palpitations, paroxysmal nocturnal dyspnea and syncope.   Respiratory: Negative for cough, hemoptysis, shortness of breath and wheezing.    Endocrine: Negative for cold intolerance and heat intolerance.   Hematologic/Lymphatic: Negative for bleeding problem. Does not bruise/bleed easily.   Skin: Negative for color change and rash.   Musculoskeletal: Negative for back pain, falls, muscle weakness and myalgias.   Gastrointestinal: Negative for  heartburn, hematemesis, hematochezia, hemorrhoids, jaundice, melena, nausea and vomiting.   Genitourinary: Negative for dysuria and hematuria.   Neurological: Negative for dizziness, focal weakness, light-headedness, loss of balance, numbness and vertigo.   Psychiatric/Behavioral: Negative for altered mental status, depression and memory loss. The patient is not nervous/anxious.    Allergic/Immunologic: Negative for hives and persistent infections.     Objective:     Vital Signs (Most Recent):  Temp: 98 °F (36.7 °C) (03/06/17 0702)  Pulse: 74 (03/06/17 0702)  Resp: 18 (03/06/17 0702)  BP: 126/78 (03/06/17 0702)  SpO2: 100 % (03/06/17 0702) Vital Signs (24h Range):  Temp:  [97.5 °F (36.4 °C)-98.2 °F (36.8 °C)] 98 °F (36.7 °C)  Pulse:  [64-87] 74  Resp:  [16-18] 18  SpO2:  [98 %-100 %] 100 %  BP: (109-126)/(70-83) 126/78     Weight: 78 kg (172 lb)  Body mass index is 30.47 kg/(m^2).    SpO2: 100 %  O2 Device (Oxygen Therapy): room air      Intake/Output Summary (Last 24 hours) at 03/06/17 0924  Last data filed at 03/05/17 1522   Gross per 24 hour   Intake           379.96 ml   Output                0 ml   Net           379.96 ml       Lines/Drains/Airways     Peripheral Intravenous Line                 Peripheral IV - Single Lumen 03/04/17 1810 Right Hand less than 1 day                Physical Exam   Constitutional: She is oriented to person, place, and time. She appears well-developed and well-nourished.  Non-toxic appearance. No distress.   HENT:   Head: Normocephalic and atraumatic.   Nose: Nose normal.   Eyes: Right eye exhibits no discharge. Left eye exhibits no discharge. Right conjunctiva is not injected. Left conjunctiva is not injected. Right pupil is round. Left pupil is round. Pupils are equal.   Neck: Neck supple. No JVD present. Carotid bruit is not present. No thyromegaly present.   Cardiovascular: Normal rate, regular rhythm, S1 normal and S2 normal.   No extrasystoles are present. PMI is not  displaced.  Exam reveals gallop and S4.    No murmur heard.  Pulses:       Radial pulses are 2+ on the right side, and 2+ on the left side.        Femoral pulses are 2+ on the right side, and 2+ on the left side.       Dorsalis pedis pulses are 2+ on the right side, and 2+ on the left side.        Posterior tibial pulses are 2+ on the right side, and 2+ on the left side.   Pulmonary/Chest: Effort normal and breath sounds normal.   Abdominal: Soft. Normal appearance. There is no hepatosplenomegaly. There is no tenderness.   Musculoskeletal:        Right ankle: She exhibits no swelling, no ecchymosis and no deformity.        Left ankle: She exhibits no swelling, no ecchymosis and no deformity.   Lymphadenopathy:        Head (right side): No submandibular adenopathy present.        Head (left side): No submandibular adenopathy present.     She has no cervical adenopathy.   Neurological: She is alert and oriented to person, place, and time. She is not disoriented. No cranial nerve deficit.   Skin: Skin is warm, dry and intact. No rash noted. She is not diaphoretic. No cyanosis. No pallor. Nails show no clubbing.   Psychiatric: She has a normal mood and affect. Her speech is normal and behavior is normal. Judgment and thought content normal. Cognition and memory are normal.       Current Medications:     amlodipine  10 mg Oral Daily    aspirin  81 mg Oral Daily    atorvastatin  80 mg Oral Daily    clopidogrel  75 mg Oral Daily    olmesartan  40 mg Oral Daily    pantoprazole  40 mg Oral Daily     Current Laboratory Results:    Recent Results (from the past 24 hour(s))   Basic metabolic panel    Collection Time: 03/06/17  5:10 AM   Result Value Ref Range    Sodium 141 136 - 145 mmol/L    Potassium 3.8 3.5 - 5.1 mmol/L    Chloride 108 95 - 110 mmol/L    CO2 24 23 - 29 mmol/L    Glucose 99 70 - 110 mg/dL    BUN, Bld 18 6 - 20 mg/dL    Creatinine 0.8 0.5 - 1.4 mg/dL    Calcium 9.5 8.7 - 10.5 mg/dL    Anion Gap 9 8 - 16  mmol/L    eGFR if African American >60 >60 mL/min/1.73 m^2    eGFR if non African American >60 >60 mL/min/1.73 m^2   Magnesium    Collection Time: 03/06/17  5:10 AM   Result Value Ref Range    Magnesium 2.1 1.6 - 2.6 mg/dL   Phosphorus    Collection Time: 03/06/17  5:10 AM   Result Value Ref Range    Phosphorus 4.0 2.7 - 4.5 mg/dL   CBC auto differential    Collection Time: 03/06/17  5:10 AM   Result Value Ref Range    WBC 7.23 3.90 - 12.70 K/uL    RBC 4.91 4.00 - 5.40 M/uL    Hemoglobin 12.7 12.0 - 16.0 g/dL    Hematocrit 37.8 37.0 - 48.5 %    MCV 77 (L) 82 - 98 fL    MCH 25.9 (L) 27.0 - 31.0 pg    MCHC 33.6 32.0 - 36.0 %    RDW 14.8 (H) 11.5 - 14.5 %    Platelets 321 150 - 350 K/uL    MPV 9.6 9.2 - 12.9 fL    Gran # 2.9 1.8 - 7.7 K/uL    Lymph # 3.7 1.0 - 4.8 K/uL    Mono # 0.4 0.3 - 1.0 K/uL    Eos # 0.2 0.0 - 0.5 K/uL    Baso # 0.02 0.00 - 0.20 K/uL    Gran% 39.7 38.0 - 73.0 %    Lymph% 51.6 (H) 18.0 - 48.0 %    Mono% 5.0 4.0 - 15.0 %    Eosinophil% 3.3 0.0 - 8.0 %    Basophil% 0.3 0.0 - 1.9 %    Differential Method Automated      Current Imaging Results:    Imaging Results         X-Ray Chest 1 View (Final result) Result time:  03/03/17 08:54:04    Final result by Jeet Costello MD (03/03/17 08:54:04)    Impression:      No acute abnormality.      Electronically signed by: JEET COSTELLO  Date:     03/03/17  Time:    08:54     Narrative:    CLINICAL HISTORY:  Chest pain    TECHNIQUE: Single view portable frontal radiograph of the chest    COMPARISON: N/A      FINDINGS:  Support devices: None    Chest: Cardiac silhouette is borderline enlarged though exaggerated by body habitus and portable technique.  Lungs are well aerated and clear.  No pleural effusion or pneumothorax.    Upper Abdomen: Normal.    Other: N/A.              Assessment and Plan:     Problem List:    Active Diagnoses:    Diagnosis Date Noted POA    PRINCIPAL PROBLEM:  Non-ST elevation myocardial infarction (NSTEMI) [I21.4] 03/04/2017 Yes    Coronary  artery disease [I25.10] 03/06/2017 Yes    Essential hypertension [I10] 03/03/2017 Yes    Mild intermittent asthma without complication [J45.20] 03/03/2017 Yes      Problems Resolved During this Admission:    Diagnosis Date Noted Date Resolved POA    Acute chest pain [R07.9] 03/03/2017 03/06/2017 Yes    Chest pain on breathing [R07.1] 03/03/2017 03/06/2017 Yes     Assessment and Plan:    1. Coronary Artery Disease   3/3/2017: NSTEMI. Troponin 0.7.   3/4/2017: Small left ventricle with normal systolic function.   [place] [facility] [place] LAD: Mid: Moderate diffuse disease of 60%. LV: Normal function.   Medical therapy.   Aspirin 81 mg Q24 indefinitely.   Clopidogrel 75 mg Q24 for 6-12 months.   High dose statin.    2. NSTEMI   3/3/2017: NSTEMI. Troponin 0.7.              Primary NSTEMI.              No acute ST-T wave changes.                3. Ventricular Tachycardia              3/3/2017: Short nonsustained run              On metoprolol.   Will change to carvedilol.     4. Hypertension              2005: Diagnosed.              On olmesartan 40 mg Q24, hctz 25 mg Q24, amlodipine 10 mg Q24 and metoprolol 50 mg Q12.   Will change metoprolol to carvedilol and resume hctz at 12.5 mg Q24.     5. Hypercholesterolemia   On atorvastatin 80 mg Q24   3/5/2017: Chol 173. HDL 50. . TG 98.      VTE Risk Mitigation         Ordered     Medium Risk of VTE  Once      03/03/17 1205          Anticipated Disposition: Home or Self Care    Discharge Needs: None unusual planned.    Raiza Renee MD  Cardiology  [facility]

## 2017-03-06 NOTE — DISCHARGE SUMMARY
[facility]  Discharge Summary      Admit Date: 3/3/2017    Discharge Date and Time:  03/06/2017 3:02 PM    Attending Physician: Poncho Morales MD     Reason for Admission: Non-ST elevation myocardial infarction (NSTEMI)    Procedures Performed: Procedure(s) (LRB):  HEART CATH-LEFT (N/A)    Hospital Course:  Ms. Ryanne Mccauley is a 51 year-old woman with a history of hypertension and asthma who presents with 2 day history of dyspnea on exertion and then chest pressure.  Patient admitted to the hospital and ruled in for a non-ST elevation myocardial infarction.  Patient received medical therapy and her chest pain and shortness of breath resolved.  Dr. Raiza Renee (cardiology) consulted and performed a left heart catheterization and coronary angiography today on 3/6/2017 which revealed that she has moderate diffuse disease involving the mid left anterior descending artery with normal left ventricular function.  Dr. Renee recommended medical therapy with dual antiplatelet therapy, beta-blocker therapy, statin therapy, and good blood pressure control.  Patient counseled on the importance of adhering to a low-salt diet and taking her prescription medication.  I have advised her also to purchase a blood pressure machine to check for blood pressure at home and to bring a blood pressure log to her clinic appointment with her primary care provider in about one week.     Consults: Cardiology    Final Diagnoses:    Principal Problem: Non-ST elevation myocardial infarction (NSTEMI)   Secondary Diagnoses:   Active Hospital Problems    Diagnosis  POA    *Non-ST elevation myocardial infarction (NSTEMI) [I21.4]  Yes     3/3/2017: NSTEMI. Troponin 0.7.      Coronary artery disease [I25.10]  Yes     3/3/2017: NSTEMI. Troponin 0.7.  [place] [facility] [place] LAD: Mid: Moderate diffuse disease of 60%. LV: Normal function.      Essential hypertension [I10]  Yes    Mild intermittent asthma without  complication [J45.20]  Yes      Resolved Hospital Problems    Diagnosis Date Resolved POA    Acute chest pain [R07.9] 03/06/2017 Yes    Chest pain on breathing [R07.1] 03/06/2017 Yes       Discharged Condition: Stable    Disposition: Home or Self Care    Follow Up/Patient Instructions:     Medications:  Reconciled Home Medications:   Current Discharge Medication List      START taking these medications    Details   aspirin (ECOTRIN) 81 MG EC tablet Take 1 tablet (81 mg total) by mouth once daily.  Qty: 30 tablet, Refills: 0      atorvastatin (LIPITOR) 80 MG tablet Take 0.5 tablets (40 mg total) by mouth every evening.  Qty: 15 tablet, Refills: 0      carvedilol (COREG) 12.5 MG tablet Take 1 tablet (12.5 mg total) by mouth 2 (two) times daily with meals.  Qty: 60 tablet, Refills: 0      clopidogrel (PLAVIX) 75 mg tablet Take 1 tablet (75 mg total) by mouth once daily.  Qty: 30 tablet, Refills: 0         CONTINUE these medications which have CHANGED    Details   albuterol (VENTOLIN HFA) 90 mcg/actuation inhaler Inhale 2 puffs into the lungs every 6 (six) hours as needed for Shortness of Breath. Rescue  Qty: 18 g, Refills: 0      amlodipine (NORVASC) 10 MG tablet Take 1 tablet (10 mg total) by mouth once daily.  Qty: 30 tablet, Refills: 0         CONTINUE these medications which have NOT CHANGED    Details   BENICAR HCT 40-25 mg per tablet Take 1 tablet by mouth every morning.       fluticasone (FLONASE) 50 mcg/actuation nasal spray 1-2 sprays by Nasal route once daily.              Discharge Procedure Orders  Diet Cardiac     Activity as tolerated     Call MD for:  temperature >100.4     Call MD for:  persistent nausea and vomiting or diarrhea     Call MD for:  severe uncontrolled pain     Call MD for:  difficulty breathing or increased cough     Call MD for:  severe persistent headache     Call MD for:  worsening rash     Call MD for:  persistent dizziness, light-headedness, or visual disturbances       Time: 40  minutes spent coordinating and completing discharge.

## 2017-03-06 NOTE — PLAN OF CARE
Pt discharging home today. CM scheduled f/u and new pt appointment with  for 3/16/2017 for 1300.  Pt and family in agreement with plan.  No further CM needs for discharge.     03/06/17 1633   Final Note   Assessment Type Final Discharge Note   Discharge Disposition Home   Discharge planning education complete? Yes   What phone number can be called within the next 1-3 days to see how you are doing after discharge? [phone]   Hospital Follow Up  Appt(s) scheduled? Yes  (3/16/2017 for 1300)   Discharge plans and expectations educations in teach back method with documentation complete? Yes   Offered [facility] -- Bedside Delivery? n/a   Discharge/Hospital Encounter Summary to (non-[facility]) PCP No  (pt has [facility], can view in [employer]   Referral to Outpatient Case Management complete? n/a   Referral to / orders for Home Health Complete? n/a   30 day supply of medicines given at discharge, if documented non-compliance / non-adherence? n/a   Any social issues identified prior to discharge? No   Did you assess the readiness or willingness of the family or caregiver to support self management of care? Yes

## 2017-03-06 NOTE — PLAN OF CARE
Problem: Patient Care Overview  Goal: Plan of Care Review  Outcome: Ongoing (interventions implemented as appropriate)  Pt eager & in agreement w/ DC. VU of DC instructions and the need to attend follow-up appointments-paperwork  passed & explained-- scripts called to pharmacy per MD. IV removed w/ cath tip intact, WNL. Voiding, ambulating, & tolerating PO well. Pt denies chest pain. Incision to R groin WNL. To be DCd home w/ family--will be escorted downstairs via  transport team once dressed, ready & ride arrives. Free from falls, injury, or skin breakdown this hospital admission. Pt discharged in no distress.

## 2017-03-06 NOTE — PROGRESS NOTES
Progress Note  [facility] Medicine      Admit Date: 3/3/2017 (LOS: 2)    SUBJECTIVE:     Follow-up For:  NSTEMI (non-ST elevated myocardial infarction)    HPI/Interval history: No acute events overnight.  She denies any chest pains.  She reports feeling a little anxious this morning about her procedure but otherwise without complaints.    Review of Systems: No fevers, chills.  No nausea, vomiting, or diarrhea.    Medications: Reviewed and documented in the MAR.    OBJECTIVE:     Vital Signs (Most Recent)  Temp: 97.8 °F (36.6 °C) (03/06/17 0515)  Pulse: 72 (03/06/17 0600)  Resp: 16 (03/06/17 0515)  BP: 125/82 (03/06/17 0515)  SpO2: 98 % (03/06/17 0515)    Vital Signs Range (Last 24H):  Temp:  [97.5 °F (36.4 °C)-98.2 °F (36.8 °C)]   Pulse:  [64-87]   Resp:  [16-18]   BP: (109-132)/(70-84)   SpO2:  [98 %-100 %]     I & O (Last 24H):  No intake or output data in the 24 hours ending 03/06/17 0642    BMI: Body mass index is 30.47 kg/(m^2).    Physical Exam:  General: Patient is awake, alert, and oriented, and in no acute distress.  Eyes: Pupils equal, round, and reactive to light, anicteric sclera.  Mouth: No lesions, moist mucous membranes.  Respiratory: Lungs clear to ausculation, no crackles or wheezing.  Cardiovascular: Regular rate and rythmn, no murmurs appreciated.  Abdomen: Soft, non-tender, non-distended, normal bowel sounds.  Skin: No rashes or breakdown.  Extremities: No edema, cyanosis, or clubbing.  Neuro: No focal weakness.    Diagnostic Results:  CBC:    Recent Labs  Lab 03/03/17  0840 03/04/17  0444 03/05/17  0430 03/06/17  0510   WBC 6.96 7.54 7.88 7.23   HGB 12.3 12.7 13.5 12.7   HCT 37.0 38.0 40.0 37.8    322 348 321   MCV 78* 77* 77* 77*     BMP:    Recent Labs  Lab 03/03/17  0840 03/04/17  0444 03/05/17  0430 03/06/17  0510   * 117* 105 99    138 141 141   K 3.6 3.4* 4.4 3.8    104 103 108   CO2 28 23 27 24   BUN 11 14 17 18   CREATININE 0.8 0.9 1.1 0.8   CALCIUM 9.2 9.6 10.5  9.5   MG 2.1 2.1 2.2 2.1   PHOS  --  4.1 4.8* 4.0       ASSESSMENT/PLAN:     Active Hospital Problems    Diagnosis  POA    *NSTEMI (non-ST elevated myocardial infarction) [I21.4]  Yes    Acute chest pain [R07.9]  Yes    Essential hypertension [I10]  Yes    Mild intermittent asthma without complication [J45.20]  Yes    ACS (acute coronary syndrome) [I24.9]  Yes    Chest pain on breathing [R07.1]  Yes      Resolved Hospital Problems    Diagnosis Date Resolved POA   No resolved problems to display.       Non-ST elevation myocardial infraction.  Continue with medical therapy as per Dr. Raiza Renee.  Echocardiogram shows normal systolic function (ejection fraction 60-65 percent).  Plan for left heart catheterization and coronary angiography this morning.  Patient has been NPO since midnight and received gentle hydration prior to anticipated contrast load today.      Hypertension.  Reasonably controlled with current regimen.  Will continue with current regimen and continue to monitor.     Asthma. Stable.     DVT prophylaxis.  Subcutaneous enoxaparin.

## 2017-03-06 NOTE — BRIEF OP NOTE
3/6/2017: [facility]: Cath: LAD: Mid: Moderate diffuse disease of 60%. LV: Normal function.    Raiza Renee M.D.

## 2017-03-06 NOTE — PLAN OF CARE
Problem: Fall Risk (Adult)  Goal: Absence of Falls  Patient will demonstrate the desired outcomes by discharge/transition of care.   Outcome: Ongoing (interventions implemented as appropriate)  No falls or injury throughout shift. Call light and personal items within reach. Bed lowered and locked.     Problem: Patient Care Overview  Goal: Plan of Care Review  Outcome: Ongoing (interventions implemented as appropriate)  Plan of care reviewed with patient and spouse. No needs at this time.

## 2017-03-08 ENCOUNTER — PATIENT OUTREACH (OUTPATIENT)
Dept: ADMINISTRATIVE | Facility: CLINIC | Age: 52
End: 2017-03-08
Payer: COMMERCIAL

## 2017-03-08 LAB — CORONARY STENOSIS: ABNORMAL

## 2017-03-08 NOTE — PATIENT INSTRUCTIONS
Discharge Instructions for Heart Attack  You have had a heart attack. Also known as acute myocardial infarction, or AMI, a heart attack occurs when a vessel supplying the heart with blood suddenly becomes blocked. Follow these guidelines for home care and lifestyle changes.  Home Care  Take your medications exactly as directed. Dont skip doses.  Remember that recovery after a heart attack takes time. Plan to rest for at least 4-8 weeks while you recover. Then return to normal activity when your doctor says its okay.  Ask your doctor about joining a heart rehabilitation program.  Tell your doctor if you are feeling depressed. Feelings of sadness are common after a heart attack, but it is important that you speak to someone if you are feeling overwhelmed by these feelings.  If you are having chest pain, call 911 for an ambulance. Do NOT drive yourself to the hospital.  Ask your family members to learn CPR.  Learn to take your own blood pressure and pulse. Keep a record of your results. Ask your doctor when you should seek emergency medical attention. He or she will tell you which blood pressure reading is dangerous.  Lifestyle Changes  Maintain a healthy weight. Get help to lose any extra pounds.  Cut back on salt.  Limit canned, dried, packaged, and fast foods.  Dont add salt to your food.  Season foods with herbs instead of salt when you cook.  Break the smoking habit. Enroll in a stop-smoking program to improve your chances of success.  Limit fatty foods.  Check your lipid levels regularly. (Your doctor can show you how to do this.)  Build up your activity according to your doctors recommendation.  Ask your doctor when its okay to resume sexual activity.  Tell your doctor about any erectile dysfunction (ED) medication you are taking. Some ED medications are not safe if you take certain heart medications.  Try to manage stress.  Follow-Up  Make a follow-up appointment as directed by our staff.    When to Seek  Medical Attention  Call 911 right away if you have:  Chest pain that is not relieved by medication.  Shortness of breath.  Otherwise, call your doctor immediately if you have:  Lightheadedness, dizziness, or fainting.  Feeling of irregular heartbeat or fast pulse.   © 8401-5175 [employer], [address] [place] [address]. All rights reserved. This information is not intended as a substitute for professional medical care. Always follow your healthcare professional's instructions.

## 2017-03-16 ENCOUNTER — LAB VISIT (OUTPATIENT)
Dept: LAB | Facility: OTHER | Age: 52
End: 2017-03-16
Attending: INTERNAL MEDICINE
Payer: COMMERCIAL

## 2017-03-16 ENCOUNTER — OFFICE VISIT (OUTPATIENT)
Dept: INTERNAL MEDICINE | Facility: CLINIC | Age: 52
End: 2017-03-16
Payer: COMMERCIAL

## 2017-03-16 VITALS
DIASTOLIC BLOOD PRESSURE: 66 MMHG | WEIGHT: 170.19 LBS | HEIGHT: 62 IN | HEART RATE: 78 BPM | SYSTOLIC BLOOD PRESSURE: 102 MMHG | OXYGEN SATURATION: 99 % | BODY MASS INDEX: 31.32 KG/M2

## 2017-03-16 DIAGNOSIS — M79.10 MYALGIA: ICD-10-CM

## 2017-03-16 DIAGNOSIS — I10 ESSENTIAL HYPERTENSION: ICD-10-CM

## 2017-03-16 DIAGNOSIS — I21.4 NON-ST ELEVATION MYOCARDIAL INFARCTION (NSTEMI): Primary | ICD-10-CM

## 2017-03-16 DIAGNOSIS — J45.20 MILD INTERMITTENT ASTHMA WITHOUT COMPLICATION: ICD-10-CM

## 2017-03-16 DIAGNOSIS — I25.10 CORONARY ARTERY DISEASE INVOLVING NATIVE CORONARY ARTERY OF NATIVE HEART WITHOUT ANGINA PECTORIS: ICD-10-CM

## 2017-03-16 LAB
ALBUMIN SERPL BCP-MCNC: 4 G/DL
ALP SERPL-CCNC: 78 U/L
ALT SERPL W/O P-5'-P-CCNC: 30 U/L
ANION GAP SERPL CALC-SCNC: 9 MMOL/L
AST SERPL-CCNC: 25 U/L
BILIRUB SERPL-MCNC: 0.6 MG/DL
BUN SERPL-MCNC: 17 MG/DL
CALCIUM SERPL-MCNC: 9.7 MG/DL
CHLORIDE SERPL-SCNC: 103 MMOL/L
CK SERPL-CCNC: 120 U/L
CO2 SERPL-SCNC: 28 MMOL/L
CREAT SERPL-MCNC: 1 MG/DL
EST. GFR  (AFRICAN AMERICAN): >60 ML/MIN/1.73 M^2
EST. GFR  (NON AFRICAN AMERICAN): >60 ML/MIN/1.73 M^2
GLUCOSE SERPL-MCNC: 104 MG/DL
POTASSIUM SERPL-SCNC: 3.8 MMOL/L
PROT SERPL-MCNC: 8.1 G/DL
SODIUM SERPL-SCNC: 140 MMOL/L

## 2017-03-16 PROCEDURE — 80053 COMPREHEN METABOLIC PANEL: CPT

## 2017-03-16 PROCEDURE — 3078F DIAST BP <80 MM HG: CPT | Mod: S$GLB,,, | Performed by: INTERNAL MEDICINE

## 2017-03-16 PROCEDURE — 82550 ASSAY OF CK (CPK): CPT

## 2017-03-16 PROCEDURE — 99204 OFFICE O/P NEW MOD 45 MIN: CPT | Mod: S$GLB,,, | Performed by: INTERNAL MEDICINE

## 2017-03-16 PROCEDURE — 3074F SYST BP LT 130 MM HG: CPT | Mod: S$GLB,,, | Performed by: INTERNAL MEDICINE

## 2017-03-16 PROCEDURE — 1160F RVW MEDS BY RX/DR IN RCRD: CPT | Mod: S$GLB,,, | Performed by: INTERNAL MEDICINE

## 2017-03-16 PROCEDURE — 36415 COLL VENOUS BLD VENIPUNCTURE: CPT

## 2017-03-16 PROCEDURE — 99999 PR PBB SHADOW E&M-EST. PATIENT-LVL III: CPT | Mod: PBBFAC,,, | Performed by: INTERNAL MEDICINE

## 2017-03-16 RX ORDER — ROSUVASTATIN CALCIUM 40 MG/1
40 TABLET, COATED ORAL NIGHTLY
Qty: 90 TABLET | Refills: 0 | Status: SHIPPED | OUTPATIENT
Start: 2017-03-16 | End: 2017-04-03 | Stop reason: SDUPTHER

## 2017-03-16 RX ORDER — OLMESARTAN MEDOXOMIL AND HYDROCHLOROTHIAZIDE 40/25 40; 25 MG/1; MG/1
TABLET ORAL
Refills: 1 | COMMUNITY
Start: 2016-12-28 | End: 2017-04-03

## 2017-03-16 NOTE — PROGRESS NOTES
Subjective:       Patient ID: Ryanne Mccauley is a 51 y.o. female.    Chief Complaint: Chest Pain (er f/u)    HPI Comments:   New pt.     Here for f/u after recent hosp stay for NSTEMI.      C/o vaguely described stiffness/discomfort/soreness which is diffuse and located especially in her neck, RUE and B posterior knees and calves. This is constant.  She suspects that it may be a SE from atorvastatin.      PMH  -Asthma since childhood.  No ER visits.  Needs albuterol sparingly.  Approx 1x/mo.  Triggers include allergies, exercise.          Chest Pain        Review of Systems   Constitutional: Negative.    HENT: Negative.    Eyes: Negative.    Respiratory: Negative.    Cardiovascular: Positive for chest pain.   Gastrointestinal: Negative.    Genitourinary: Negative.    Musculoskeletal: Negative.    Skin: Negative.    Neurological: Negative.    Psychiatric/Behavioral: Negative.        Past Medical History:   Diagnosis Date    Abnormal Pap smear of cervix     Asthma     Hypertension        History reviewed. No pertinent surgical history.    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  Has 2 grown children.       Objective:       Vitals:    03/16/17 1312   BP: 102/66   Pulse: 78   SpO2: 99%   Weight: 77.2 kg (170 lb 3.1 oz)   Height: 5'  "2" (1.575 m)     Physical Exam   Constitutional: She is oriented to person, place, and time. She appears well-developed and well-nourished.   HENT:   Head: Normocephalic and atraumatic.   Right Ear: Tympanic membrane, external ear and ear canal normal.   Left Ear: Tympanic membrane, external ear and ear canal normal.   Mouth/Throat: Oropharynx is clear and moist. No oropharyngeal exudate or posterior oropharyngeal erythema.   Eyes: Conjunctivae and EOM are normal. Pupils are equal, round, and reactive to light.   Neck: Normal range of motion. Neck supple. No thyromegaly present.   Cardiovascular: Normal rate, regular rhythm and normal heart sounds.  Exam reveals no gallop and no friction rub.    No murmur heard.  Pulmonary/Chest: Effort normal and breath sounds normal. She has no wheezes. She has no rales.   Abdominal: Soft. Bowel sounds are normal. She exhibits no distension. There is no tenderness. There is no rebound and no guarding.   Musculoskeletal: Normal range of motion. She exhibits no edema.   Lymphadenopathy:     She has no cervical adenopathy.   Neurological: She is alert and oriented to person, place, and time. She has normal strength. She displays no atrophy and no tremor. No sensory deficit. She exhibits normal muscle tone. Gait normal.   Skin: Skin is warm and dry. No rash noted.   Psychiatric: She has a normal mood and affect. Her behavior is normal. Thought content normal.   Vitals reviewed.      Assessment:       1. Non-ST elevation myocardial infarction (NSTEMI)    2. Coronary artery disease involving native coronary artery of native heart without angina pectoris    3. Essential hypertension    4. Mild intermittent asthma without complication    5. Myalgia        Plan:           NSTEMI - Pt had angiogram during her hosp stay with no stent and with recommendation for OMT.  Cont this.      HTN - At goal.  Cont current tx.    Asthma - Mild, intermittent.  Cont PRN albuterol.     Myalgia - Possibly a " SE from atorvastatin.  D/c this and replace with rosuvastatin.  Chk CK, LFTs.

## 2017-03-16 NOTE — MR AVS SNAPSHOT
[facility]  [address]  [facility] [address]  Phone: [phone]  Fax: [phone]                  Ryanne Mccauley   3/16/2017 1:00 PM   Office Visit    Description:  Female : 1965   Provider:  Artur Freire MD   Department:  [facility]           Reason for Visit     Chest Pain           Diagnoses this Visit        Comments    Non-ST elevation myocardial infarction (NSTEMI)    -  Primary     Coronary artery disease involving native coronary artery of native heart without angina pectoris         Essential hypertension         Mild intermittent asthma without complication         Myalgia                To Do List           Future Appointments        Provider Department Dept Phone    3/16/2017 2:00 PM LAB, [facility] [phone]      Goals (5 Years of Data)     None      Follow-Up and Disposition     Return in about 6 months (around 2017), or if symptoms worsen or fail to improve.       These Medications        Disp Refills Start End    rosuvastatin (CRESTOR) 40 MG Tab 90 tablet 0 3/16/2017 3/16/2018    Take 1 tablet (40 mg total) by mouth every evening. - Oral    Pharmacy: [facility]/pharmacy #[id number] - [place], [place] - [address] [place] [address] Ph #: [phone]         [facility]     [facility] On [facility] Nurse Care Line -  Assistance  Registered nurses in the [facility] Center provide clinical advisement, health education, appointment booking, and other advisory services.  Call for this free service at [phone].             Medications           Message regarding Medications     Verify the changes and/or additions to your medication regime listed below are the same as discussed with your clinician today.  If any of these changes or additions are incorrect, please notify your healthcare provider.        START taking these NEW medications        Refills    rosuvastatin (CRESTOR) 40 MG Tab 0    Sig: Take 1  "tablet (40 mg total) by mouth every evening.    Class: Normal    Route: Oral      STOP taking these medications     atorvastatin (LIPITOR) 80 MG tablet Take 0.5 tablets (40 mg total) by mouth every evening.           Verify that the below list of medications is an accurate representation of the medications you are currently taking.  If none reported, the list may be blank. If incorrect, please contact your healthcare provider. Carry this list with you in case of emergency.           Current Medications     albuterol (VENTOLIN HFA) 90 mcg/actuation inhaler Inhale 2 puffs into the lungs every 6 (six) hours as needed for Shortness of Breath. Rescue    amlodipine (NORVASC) 10 MG tablet Take 1 tablet (10 mg total) by mouth once daily.    aspirin (ECOTRIN) 81 MG EC tablet Take 1 tablet (81 mg total) by mouth once daily.    carvedilol (COREG) 12.5 MG tablet Take 1 tablet (12.5 mg total) by mouth 2 (two) times daily with meals.    clopidogrel (PLAVIX) 75 mg tablet Take 1 tablet (75 mg total) by mouth once daily.    olmesartan-hydrochlorothiazide (BENICAR HCT) 40-25 mg per tablet TAKE 1 TABLET BY MOUTH EVERY MORNING    fluticasone (FLONASE) 50 mcg/actuation nasal spray 1-2 sprays by Nasal route once daily.     rosuvastatin (CRESTOR) 40 MG Tab Take 1 tablet (40 mg total) by mouth every evening.           Clinical Reference Information           Your Vitals Were     BP Pulse Height Weight SpO2 BMI    102/66 78 5' 2" (1.575 m) 77.2 kg (170 lb 3.1 oz) 99% 31.13 kg/m2      Blood Pressure          Most Recent Value    BP  102/66      Allergies as of 3/16/2017     Ace Inhibitors      Immunizations Administered on Date of Encounter - 3/16/2017     None      Orders Placed During Today's Visit     Future Labs/Procedures Expected by Expires    CK  3/16/2017 5/15/2018    Comprehensive metabolic panel  3/16/2017 5/15/2018      Language Assistance Services     ATTENTION: Language assistance services are available, free of charge. Please " call [phone].      ATENCIÓN: Si habla español, tiene a cabello disposición servicios gratuitos de asistencia lingüística. Llame al [phone].     CHÚ Ý: N?u b?n nói Ti?ng Vi?t, có các d?ch v? h? tr? ngôn ng? mi?n phí dành cho b?n. G?i s? [phone].         [facility] complies with applicable Federal civil rights laws and does not discriminate on the basis of race, color, national origin, age, disability, or sex.

## 2017-03-20 ENCOUNTER — PATIENT MESSAGE (OUTPATIENT)
Dept: INTERNAL MEDICINE | Facility: CLINIC | Age: 52
End: 2017-03-20

## 2017-04-03 PROBLEM — I24.9 ACS (ACUTE CORONARY SYNDROME): Status: RESOLVED | Noted: 2017-03-06 | Resolved: 2017-04-03

## 2017-04-03 PROBLEM — E78.00 HYPERCHOLESTEROLEMIA: Status: ACTIVE | Noted: 2017-04-03

## 2017-04-03 PROBLEM — I47.20 VENTRICULAR TACHYCARDIA: Status: ACTIVE | Noted: 2017-04-03

## 2017-04-03 PROBLEM — E66.9 MILD OBESITY: Status: ACTIVE | Noted: 2017-04-03

## 2017-04-03 PROBLEM — I25.2 HISTORY OF MYOCARDIAL INFARCTION: Status: ACTIVE | Noted: 2017-03-04

## 2024-01-10 ENCOUNTER — HOSPITAL ENCOUNTER (EMERGENCY)
Facility: HOSPITAL | Age: 59
Discharge: HOME | End: 2024-01-11
Attending: EMERGENCY MEDICINE
Payer: COMMERCIAL

## 2024-01-10 DIAGNOSIS — H33.21 RIGHT RETINAL DETACHMENT: Primary | ICD-10-CM

## 2024-01-10 LAB
ALBUMIN SERPL BCP-MCNC: 4.2 G/DL (ref 3.5–5.2)
ALP SERPL-CCNC: 65 U/L (ref 55–135)
ALT SERPL W/O P-5'-P-CCNC: 20 U/L (ref 10–44)
ANION GAP SERPL CALC-SCNC: 8 MMOL/L (ref 8–16)
APTT PPP: 26.5 SEC (ref 21–32)
AST SERPL-CCNC: 25 U/L (ref 10–40)
BASOPHILS # BLD AUTO: 0.03 K/UL (ref 0–0.2)
BASOPHILS NFR BLD: 0.4 % (ref 0–1.9)
BILIRUB SERPL-MCNC: 0.8 MG/DL (ref 0.1–1)
BUN SERPL-MCNC: 14 MG/DL (ref 6–20)
CALCIUM SERPL-MCNC: 9.6 MG/DL (ref 8.7–10.5)
CHLORIDE SERPL-SCNC: 104 MMOL/L (ref 95–110)
CO2 SERPL-SCNC: 27 MMOL/L (ref 23–29)
CREAT SERPL-MCNC: 0.9 MG/DL (ref 0.5–1.4)
DIFFERENTIAL METHOD BLD: ABNORMAL
EOSINOPHIL # BLD AUTO: 0.2 K/UL (ref 0–0.5)
EOSINOPHIL NFR BLD: 3.3 % (ref 0–8)
ERYTHROCYTE [DISTWIDTH] IN BLOOD BY AUTOMATED COUNT: 14.5 % (ref 11.5–14.5)
EST. GFR  (NO RACE VARIABLE): >60 ML/MIN/1.73 M^2
GLUCOSE SERPL-MCNC: 116 MG/DL (ref 70–110)
HCT VFR BLD AUTO: 35.2 % (ref 37–48.5)
HCV AB SERPL QL IA: NORMAL
HGB BLD-MCNC: 11.5 G/DL (ref 12–16)
HIV 1+2 AB+HIV1 P24 AG SERPL QL IA: NORMAL
IMM GRANULOCYTES # BLD AUTO: 0.02 K/UL (ref 0–0.04)
IMM GRANULOCYTES NFR BLD AUTO: 0.3 % (ref 0–0.5)
LYMPHOCYTES # BLD AUTO: 3.8 K/UL (ref 1–4.8)
LYMPHOCYTES NFR BLD: 53.3 % (ref 18–48)
MCH RBC QN AUTO: 25.7 PG (ref 27–31)
MCHC RBC AUTO-ENTMCNC: 32.7 G/DL (ref 32–36)
MCV RBC AUTO: 79 FL (ref 82–98)
MONOCYTES # BLD AUTO: 0.4 K/UL (ref 0.3–1)
MONOCYTES NFR BLD: 5.5 % (ref 4–15)
NEUTROPHILS # BLD AUTO: 2.7 K/UL (ref 1.8–7.7)
NEUTROPHILS NFR BLD: 37.2 % (ref 38–73)
NRBC BLD-RTO: 0 /100 WBC
PLATELET # BLD AUTO: 281 K/UL (ref 150–450)
PMV BLD AUTO: 10.2 FL (ref 9.2–12.9)
POTASSIUM SERPL-SCNC: 3.8 MMOL/L (ref 3.5–5.1)
PROT SERPL-MCNC: 8.3 G/DL (ref 6–8.4)
RBC # BLD AUTO: 4.48 M/UL (ref 4–5.4)
SODIUM SERPL-SCNC: 139 MMOL/L (ref 136–145)
WBC # BLD AUTO: 7.21 K/UL (ref 3.9–12.7)

## 2024-01-10 PROCEDURE — 99283 EMERGENCY DEPT VISIT LOW MDM: CPT

## 2024-01-10 PROCEDURE — 80053 COMPREHEN METABOLIC PANEL: CPT | Performed by: PHYSICIAN ASSISTANT

## 2024-01-10 PROCEDURE — 87389 HIV-1 AG W/HIV-1&-2 AB AG IA: CPT | Performed by: PHYSICIAN ASSISTANT

## 2024-01-10 PROCEDURE — 85730 THROMBOPLASTIN TIME PARTIAL: CPT | Performed by: PHYSICIAN ASSISTANT

## 2024-01-10 PROCEDURE — 86803 HEPATITIS C AB TEST: CPT | Performed by: PHYSICIAN ASSISTANT

## 2024-01-10 PROCEDURE — 85025 COMPLETE CBC W/AUTO DIFF WBC: CPT | Performed by: PHYSICIAN ASSISTANT

## 2024-01-11 ENCOUNTER — OFFICE VISIT (OUTPATIENT)
Dept: OPHTHALMOLOGY | Facility: CLINIC | Age: 59
End: 2024-01-11
Payer: COMMERCIAL

## 2024-01-11 VITALS
RESPIRATION RATE: 16 BRPM | OXYGEN SATURATION: 99 % | WEIGHT: 175 LBS | SYSTOLIC BLOOD PRESSURE: 155 MMHG | TEMPERATURE: 98 F | HEART RATE: 99 BPM | BODY MASS INDEX: 32.2 KG/M2 | DIASTOLIC BLOOD PRESSURE: 75 MMHG | HEIGHT: 62 IN

## 2024-01-11 DIAGNOSIS — H25.13 AGE-RELATED NUCLEAR CATARACT OF BOTH EYES: ICD-10-CM

## 2024-01-11 DIAGNOSIS — H43.11 VITREOUS HEMORRHAGE, RIGHT: ICD-10-CM

## 2024-01-11 DIAGNOSIS — H36.821: Primary | ICD-10-CM

## 2024-01-11 DIAGNOSIS — H35.033 HYPERTENSIVE RETINOPATHY OF BOTH EYES: ICD-10-CM

## 2024-01-11 PROBLEM — H33.21 RETINAL DETACHMENT, RIGHT: Status: ACTIVE | Noted: 2024-01-11

## 2024-01-11 PROCEDURE — 99204 OFFICE O/P NEW MOD 45 MIN: CPT | Mod: 25,S$GLB,, | Performed by: OPHTHALMOLOGY

## 2024-01-11 PROCEDURE — 1159F MED LIST DOCD IN RCRD: CPT | Mod: CPTII,S$GLB,, | Performed by: OPHTHALMOLOGY

## 2024-01-11 PROCEDURE — 99999 PR PBB SHADOW E&M-EST. PATIENT-LVL III: CPT | Mod: PBBFAC,,, | Performed by: OPHTHALMOLOGY

## 2024-01-11 PROCEDURE — 67028 INJECTION EYE DRUG: CPT | Mod: RT,S$GLB,, | Performed by: OPHTHALMOLOGY

## 2024-01-11 PROCEDURE — 1160F RVW MEDS BY RX/DR IN RCRD: CPT | Mod: CPTII,S$GLB,, | Performed by: OPHTHALMOLOGY

## 2024-01-11 PROCEDURE — 92134 CPTRZ OPH DX IMG PST SGM RTA: CPT | Mod: S$GLB,,, | Performed by: OPHTHALMOLOGY

## 2024-01-11 PROCEDURE — 92235 FLUORESCEIN ANGRPH MLTIFRAME: CPT | Mod: S$GLB,,, | Performed by: OPHTHALMOLOGY

## 2024-01-11 RX ORDER — EZETIMIBE 10 MG/1
1 TABLET ORAL DAILY
COMMUNITY
Start: 2023-02-01

## 2024-01-11 RX ORDER — FLUTICASONE PROPIONATE 50 MCG
2 SPRAY, SUSPENSION (ML) NASAL
COMMUNITY
Start: 2023-08-09

## 2024-01-11 RX ORDER — OLMESARTAN MEDOXOMIL, AMLODIPINE AND HYDROCHLOROTHIAZIDE TABLET 40/10/25 MG 40; 10; 25 MG/1; MG/1; MG/1
1 TABLET ORAL DAILY
COMMUNITY
Start: 2023-02-03

## 2024-01-11 RX ORDER — FLUORESCEIN 500 MG/ML
5 INJECTION INTRAVENOUS
Status: COMPLETED | OUTPATIENT
Start: 2024-01-11 | End: 2024-01-11

## 2024-01-11 RX ADMIN — Medication 1.25 MG: at 11:01

## 2024-01-11 RX ADMIN — FLUORESCEIN 500 MG: 500 INJECTION INTRAVENOUS at 10:01

## 2024-01-11 NOTE — ED PROVIDER NOTES
Encounter Date: 1/10/2024       History     Chief Complaint   Patient presents with    Eye Problem     Started yest with something in my eye, seen by eye  and told had christofer conteh     58-year-old female with a history of asthma, hypertension presents the ER for evaluation of visual disturbance.  Patient states that she thought she had a lash in her right eye yesterday evening.  She started to see a squiggly line and some shadowing.  Patient states was seen by outpatient Ophthalmology who evaluated her.  She states she was told that she has a retinal detachment of the right eye.  She was sent to the emergency room for further evaluation.  She uses glasses for nearsightedness.  Patient denies any contact lens use.  No injury or trauma.  No drainage or redness to the eye.  No pain.    The history is provided by the patient.     Review of patient's allergies indicates:   Allergen Reactions    Ace inhibitors      Past Medical History:   Diagnosis Date    Abnormal Pap smear of cervix     Asthma     Hypercholesterolemia 4/3/2017    3/2017: Began statin.    Hypertension     Mild obesity 4/3/2017    4/3/2017: Weight 76 kg. BMI 31.    Ventricular tachycardia 4/3/2017    3/6/2017: Short run after NSTEMI.     History reviewed. No pertinent surgical history.  Family History   Problem Relation Age of Onset    Diabetes Mother     No Known Problems Father     No Known Problems Brother     No Known Problems Brother     No Known Problems Brother     Breast cancer Neg Hx     Colon cancer Neg Hx     Ovarian cancer Neg Hx      Social History     Tobacco Use    Smoking status: Never    Smokeless tobacco: Never   Substance Use Topics    Alcohol use: No    Drug use: No     Review of Systems   Constitutional:  Negative for chills and fever.   HENT:  Negative for congestion.    Eyes:  Positive for visual disturbance. Negative for photophobia, pain, discharge, redness and itching.   Respiratory:  Negative for shortness of breath.     Cardiovascular:  Negative for chest pain.   Gastrointestinal:  Negative for nausea and vomiting.   Genitourinary:  Negative for dysuria and flank pain.   Musculoskeletal:  Negative for myalgias.   Skin:  Negative for rash.   Allergic/Immunologic: Negative for immunocompromised state.   Neurological:  Negative for weakness and numbness.   Hematological:  Does not bruise/bleed easily.   Psychiatric/Behavioral:  Negative for confusion.        Physical Exam     Initial Vitals [01/10/24 1843]   BP Pulse Resp Temp SpO2   (!) 171/99 109 18 97.8 °F (36.6 °C) 99 %      MAP       --         Physical Exam    Vitals reviewed.  Constitutional: She appears well-developed and well-nourished. She is not diaphoretic. No distress.   HENT:   Head: Normocephalic and atraumatic.   crude visual field exam shows decreased vision to the lateral and inferior quadrant    Eyes: Conjunctivae and EOM are normal. Pupils are equal, round, and reactive to light.   Neck: Neck supple.   Pulmonary/Chest: No respiratory distress.   Musculoskeletal:         General: Normal range of motion.      Cervical back: Neck supple.     Neurological: She is alert and oriented to person, place, and time.         ED Course   Procedures  Labs Reviewed   CBC W/ AUTO DIFFERENTIAL - Abnormal; Notable for the following components:       Result Value    Hemoglobin 11.5 (*)     Hematocrit 35.2 (*)     MCV 79 (*)     MCH 25.7 (*)     Gran % 37.2 (*)     Lymph % 53.3 (*)     All other components within normal limits    Narrative:     Release to patient->Immediate   COMPREHENSIVE METABOLIC PANEL - Abnormal; Notable for the following components:    Glucose 116 (*)     All other components within normal limits    Narrative:     Release to patient->Immediate   APTT    Narrative:     Release to patient->Immediate   HIV 1 / 2 ANTIBODY    Narrative:     Release to patient->Immediate   HEPATITIS C ANTIBODY    Narrative:     Release to patient->Immediate          Imaging Results     None          Medications - No data to display  Medical Decision Making  Amount and/or Complexity of Data Reviewed  Labs: ordered.               ED Course as of 01/10/24 2221   Wed Lacho 10, 2024   1934 Patient seen in the ER promptly upon arrival.  She is afebrile, no acute distress.  Physical examination reveals normal extraocular movement.  Patient does have some shadowing to the right lateral and inferior field of the right eye.  Patient does come with paperwork from ophthalmology.  Documented retinal detachment of the right eye, macula off RD   [AJ]   1935 Discussed case with Ophthalmology who will evaluate patient in the ED.- recommended labs [AJ]   2114 Patient pending ophthalmology evaluation. [AJ]   2219 Case was signed out to Karrie Ayala PA-C shift for final disposition pending ophthalmology evaluation.    The care of this patient was overseen by attending physician who agrees with treatment, plan, and disposition.    Disclaimer: This note has been generated using voice-recognition software. There may be typographical errors that have been missed during proof-reading.     [AJ]      ED Course User Index  [AJ] Pauline Posada PA-C                           Clinical Impression:  Final diagnoses:  [H33.21] Right retinal detachment (Primary)                 Pauline Posada PA-C  01/10/24 2221

## 2024-01-11 NOTE — PROVIDER PROGRESS NOTES - EMERGENCY DEPT.
Encounter Date: 1/10/2024    ED Physician Progress Notes        Physician Note:   Patient signed out to me by my colleague with instructions to follow-up pending work-up. Please see main ED note for previous ED stay documentation.      Patient signed out to me pending ophthalmology recommendations.  Brief history: 58-year-old female with a history of asthma, hypertension presents the ER for evaluation of visual disturbance.  Was referred by outpatient ophthalmology for retinal detachment.    Per ophthalmology recommendations:  Follow up outpatient in clinic tomorrow. Strict ED return precautions given.  Patient verbalized understanding and agreed to plan.

## 2024-01-11 NOTE — PROGRESS NOTES
HPI    Consult Retina Eval for Retinal Tear OD -vs- Detachment    DLS 01/10/2024 by Dr. Dao Cortez MD    CC: pt reports : Vision blurry and cloudy OD.     ++blurred Va  --eye pain  ++floaters w/o flashes/  --diplopia  --headaches ( pt did have HA's a couple weeks ago)  --curtain/++shadow OD/ veils    Eye Meds: NONE    POHx:   1. Retinal Tear OD   Last edited by Lori Rivas MA on 1/11/2024  8:36 AM.         A/P    ICD-10-CM ICD-9-CM   1. Proliferative sickle cell retinopathy of right eye  H36.821 282.60     362.29   2. Vitreous hemorrhage, right  H43.11 379.23   3. Age-related nuclear cataract of both eyes  H25.13 366.16   4. Hypertensive retinopathy of both eyes  H35.033 362.11       1. 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 Vitreous hemorrhage, right  Urgent eval for retina detachment  Pt was seen in ED last night - Recent notes reviewed     Noted blurred vision earlier this week  No trauma    Exam today notable for focal area that looks like neovascularization nasally OD, pt has sickle trait, no RT/RD with  on thorough examination by both myself and fellow Dr. June, retina is attached in area of heme    FA today did not show nate leakage but had significant heme blocking     Plan: given findings, likely VH though moreso related to ischemia and neovascularization in periphery, no RT/RD, recommend Avastin OD  Based on todays exam, diagnostic studies, and review of records, the determination was made for treatment today.  Schedule Avastin Injection today Right Eye Patient chooses to proceed with injection R/B/A discussed include infection retinal detachment and stroke    Patient identified.  Timeout performed.    Risks, benefits, and alternatives to treatment were discussed in detail with the patient, including bleeding/infection (endophthalmitis)/etc.  The patient voiced understanding and wished to proceed with the procedure.  See separate consent form.    Injection Procedure  Note:  Diagnosis: NV Right Eye    Topical Proparacaine drop placed then topical 5% Betadine and then subconjunctival lidocaine 2% injection  Sterile gloves used, and sterile lid speculum placed.  5% Betadine placed at injection site again prior to injection.  Avastin 1.25mg in 0.05cc Injected inferotemporally 3.5-4mm posterior to the limbus.  Complications: None  Va at least CF at 5 feet post injection.  Retina, ONH, IOP normal after injection.    Followup as below.  Patient should return immediately PRN.  Retinal Detachment and Endophthalmitis precautions given.     3. Age-related nuclear cataract of both eyes  Mild NS/CC changes od>os  Plan: Observation for now    4. Hypertensive retinopathy of both eyes  PCP Brenden Neal MD (Inactive)  02/01/2022  6.2  A1C   Plan: Observation   Recommend good blood pressure control, tight blood glucose control, and good cholesterol control     RTC 2 weeks DFE/OCTm OU    I saw and examined the patient and reviewed in detail the findings documented. The final examination findings, image interpretations which have been independently interpreted, and plan as documented in the record represent my personal judgment and conclusions.    Hira Jones MD  Vitreoretinal Surgery   [facility]

## 2024-01-11 NOTE — ED TRIAGE NOTES
Ryanne Mccauley, a 58 y.o. female presents to the ED w/ complaint of retinal detachment.  Patient seen at eye clinic and told she needs emergent surgery.  Presents with referral.    Triage note:  Chief Complaint   Patient presents with    Eye Problem     Started yest with something in my eye, seen by eye dr and told had torn retinz     Review of patient's allergies indicates:   Allergen Reactions    Ace inhibitors      Past Medical History:   Diagnosis Date    Abnormal Pap smear of cervix     Asthma     Hypercholesterolemia 4/3/2017    3/2017: Began statin.    Hypertension     Mild obesity 4/3/2017    4/3/2017: Weight 76 kg. BMI 31.    Ventricular tachycardia 4/3/2017    3/6/2017: Short run after NSTEMI.

## 2024-01-11 NOTE — CONSULTS
"Consultation Report  Ophthalmology Service    Date: 01/11/2024    Reason for Consult: "retinal detachment "     History of Present Illness: Ryanne Mccauley is a 58 y.o. female who reports being nearsighted and wearing glasses, no significant PMH who presented to [facility] ED with concern for retinal detachment of the right eye. Patient noted that on Tuesday she noticed a "hair" in her vision in the right eye. She had cut her hair on Monday and attributed the visual change to a leftover strand of hair. She presented to optometry this morning to have the hair flushed out before her trip to [facility] tomorrow, but was then urgently sent to an ophthalmologist. She was diagnosed with a macula off retinal detachment and referred to the ED for surgery. She carried a note from her ophthalmologist, seen at bedside, that stated a mac off RD, and she also had photos on her phone from [facility] imaging taken at the ophthalmologist's office, which were reviewed at bedside. Patient reported that her vision gradually worsened since Tuesday. Denies eye pain, affirms blurry vision, floaters, occasionally flashing lights but not constant.     POcularHx: Denies history of ocular problems or past ocular surgeries.    Current eye gtts: Denies     Family Hx:  family history includes Diabetes in her mother; No Known Problems in her brother, brother, brother, and father.     PMHx:  has a past medical history of Abnormal Pap smear of cervix, Asthma, Hypercholesterolemia (4/3/2017), Hypertension, Mild obesity (4/3/2017), and Ventricular tachycardia (4/3/2017).     PSurgHx:  has no past surgical history on file.     Home Medications:   Prior to Admission medications    Medication Sig Start Date End Date Taking? Authorizing Provider   albuterol (VENTOLIN HFA) 90 mcg/actuation inhaler Inhale 2 puffs into the lungs every 6 (six) hours as needed for Shortness of Breath. Rescue 3/6/17   Poncho Morales MD   amlodipine (NORVASC) 10 MG tablet Take 1 " tablet (10 mg total) by mouth once daily. 5/5/17   Raiza Renee MD   aspirin (ECOTRIN) 81 MG EC tablet Take 1 tablet (81 mg total) by mouth once daily. 4/3/17 4/3/18  Raiza Renee MD   clopidogrel (PLAVIX) 75 mg tablet Take 1 tablet (75 mg total) by mouth once daily. 4/3/17 4/3/18  Raiza Renee MD   metoprolol succinate (TOPROL-XL) 100 MG 24 hr tablet TAKE 1 TABLET (100 MG TOTAL) BY MOUTH ONCE DAILY. 6/19/18 6/19/19  Raiza Renee MD   olmesartan (BENICAR) 40 MG tablet Take 1 tablet (40 mg total) by mouth once daily. 4/3/17 4/3/18  Raiza Renee MD   rosuvastatin (CRESTOR) 10 MG tablet Take 1 tablet (10 mg total) by mouth every evening. 7/5/17 7/5/18  Raiza Renee MD        Medications this encounter:     Allergies: is allergic to ace inhibitors.     Social:  reports that she has never smoked. She has never used smokeless tobacco. She reports that she does not drink alcohol and does not use drugs.     ROS: As per HPI    Ocular examination/Dilated fundus examination:  Base Eye Exam       Visual Acuity (Snellen - Linear)         Right Left    Dist sc CF @ 6' 20/40 -2    Dist ph sc NI 20/20 -1              Tonometry (Tonopen, 11:58 PM)         Right Left    Pressure 21 18              Pupils         Dark Light Shape React APD    Right 5 3 Round Brisk None    Left 4.5 3 Round Brisk None              Visual Fields         Right Left      Full              Extraocular Movement         Right Left     Full, Ortho Full, Ortho              Dilation       Both eyes: 2.5% Phenylephrine @ 10:00                  Slit Lamp and Fundus Exam       External Exam         Right Left    External Normal Normal              Slit Lamp Exam         Right Left    Lids/Lashes Normal Normal    Conjunctiva/Sclera White and quiet White and quiet    Cornea clear Clear    Anterior Chamber Deep and formed Deep and formed    Iris Round and reactive Round and reactive    Lens 2+ NSC, 1+ cortical cataract nasally NSC    Anterior  Vitreous vit heme Normal              Fundus Exam         Right Left    Disc poor view Normal    Macula obscured by heme Normal    Vessels  Normal    Periphery Poor view due to vit heme, 2 clock hour tear noted nasally with some surrounding elevation. On B-scan detachment noted nasally but not seen tracking centrally flat 360, no heme, tears, detachment seen. No diabetic retinopathy    econdary to heme, Poor view in right eye                      Assessment/Plan:     1. Rhegmatogenous retinal detachment, right eye  2. Vitreous hemorrhage right eye  3. Retinal tear, right eye  - 2 clock hour tear nasal to disc noted with some surrounding elevation, difficult to assess extent of detachment given poor view due to vit heme  - B-scan over posterior pole with significant vitreous hemorrhage. Retinal detachment noted nasally, but no obvious signs of posterior pole involvement.   - recommend elevation of head, activity restriction, avoid heavy lifting. Counseled patient on strategies for the above goals.   - NPO status at midnight, will have patient present to clinic tomorrow morning at 8:00. Patient lives in [place].Patient agreed to plan to present to clinic tomorrow morning. Counseled on importance of NPO status.   - seen with Dr. Angeles, PGY3. Discussed case with Dr. June, retina fellow    Patient's Best Contact Number: [phone] (robert mendez, )    Dao Perez MD   [facility] Ophthalmology  01/11/2024  12:02 AM

## 2024-01-19 ENCOUNTER — TELEPHONE (OUTPATIENT)
Dept: OPTOMETRY | Facility: CLINIC | Age: 59
End: 2024-01-19
Payer: COMMERCIAL

## 2024-01-19 NOTE — TELEPHONE ENCOUNTER
----- Message -----   From: Staci Jerome   Sent: 1/18/2024   4:39 PM CST   To: Robert Iniguez Staff   Subject: Consult/Advisory                                 Consult/Advisory     Name Of Caller: Lon Mccauley, patient's        Contact Preference: [phone] (Home Phone       Nature of call: Patient's  calling regarding wife's condition. Patient still seeing squiggly lines and cannot see out of the right eye. Please call back for patient advice ASAP.     [facility] appt

## 2024-01-22 NOTE — PROGRESS NOTES
HPI    Consult Retina Eval for Retinal Tear OD -vs- Detachment    DLS 01/11/2024 by  Dr. ROSA Jones MD    CC: pt reports : Vision blurry and cloudy OD and floaters are still   present    ++blurred Va  --eye pain  ++floaters w/o flashes/  --diplopia  --headaches    --curtain/shadows/veils    Eye Meds: NONE    POHx:   1. Retinal Tear OD   2. Proliferative sickle cell retinopathy of right eye  Last edited by Lori Rivas MA on 1/23/2024  9:24 AM.          A/P    ICD-10-CM ICD-9-CM   1. Proliferative sickle cell retinopathy of right eye  H36.821 282.60     362.29   2. Vitreous hemorrhage, right  H43.11 379.23   3. Age-related nuclear cataract of both eyes  H25.13 366.16   4. Hypertensive retinopathy of both eyes  H35.033 362.11       1. 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 Vitreous hemorrhage, right  Here for retina/VH f/u    No trauma preceding, has sickle trait    FA 1/11/24 did not show nate leakage but had significant heme blocking     S/p OZZY 1/11/24    Today 1/23/2024   VA 20/25 (was 20/70), pt feels heme is better, has old heme on exam today with some heme overlaying macula, nasal heme much better with focal small area NV noted on exam  , no RT/RD with   Plan:  given neovascularization on exam, recommend sectoral PRP OD for active NV  Based on todays exam, diagnostic studies, and review of records, the determination was made for treatment today.  Schedule Panretinal photocoagulation today Right Eye Patient chooses to proceed with laser R/B/A discussed patient elects to proceed    Patient identified.  Timeout performed.    Laser Procedure Note  Panretinal Photocoagulation laser Right Eye  Anesthesia: topical Proparacaine  Complications: none  Size: 200 microns  Duration: 80 ms  Power: 280  Number: 266  Good laser uptake, no complications  F/u as above, RTC sooner PRN     3. Age-related nuclear cataract of both eyes  Mild NS/CC changes od>os  Plan: Observation for now, would benefit from  CEIOL in future given large cortical component     4. Hypertensive retinopathy of both eyes  PCP Brenden Neal MD (Inactive), outside primaryy care notes reviewed   02/01/2022  6.2  A1C   Plan: Observation mod HTN changes  Recommend good blood pressure control, tight blood glucose control, and good cholesterol control     RTC 4-6 weeks DFE/OCTm OU, possible Avastin OD , consider repeat FA     I saw and examined the patient and reviewed in detail the findings documented. The final examination findings, image interpretations which have been independently interpreted, and plan as documented in the record represent my personal judgment and conclusions.    Hira Jones MD  Vitreoretinal Surgery   [facility]

## 2024-01-23 ENCOUNTER — OFFICE VISIT (OUTPATIENT)
Dept: OPHTHALMOLOGY | Facility: CLINIC | Age: 59
End: 2024-01-23
Payer: COMMERCIAL

## 2024-01-23 DIAGNOSIS — H43.11 VITREOUS HEMORRHAGE, RIGHT: ICD-10-CM

## 2024-01-23 DIAGNOSIS — H25.13 AGE-RELATED NUCLEAR CATARACT OF BOTH EYES: ICD-10-CM

## 2024-01-23 DIAGNOSIS — H36.821: Primary | ICD-10-CM

## 2024-01-23 DIAGNOSIS — H35.033 HYPERTENSIVE RETINOPATHY OF BOTH EYES: ICD-10-CM

## 2024-01-23 PROCEDURE — 1159F MED LIST DOCD IN RCRD: CPT | Mod: CPTII,S$GLB,, | Performed by: OPHTHALMOLOGY

## 2024-01-23 PROCEDURE — 92134 CPTRZ OPH DX IMG PST SGM RTA: CPT | Mod: S$GLB,,, | Performed by: OPHTHALMOLOGY

## 2024-01-23 PROCEDURE — 1160F RVW MEDS BY RX/DR IN RCRD: CPT | Mod: CPTII,S$GLB,, | Performed by: OPHTHALMOLOGY

## 2024-01-23 PROCEDURE — 67228 TREATMENT X10SV RETINOPATHY: CPT | Mod: RT,S$GLB,, | Performed by: OPHTHALMOLOGY

## 2024-01-23 PROCEDURE — 99214 OFFICE O/P EST MOD 30 MIN: CPT | Mod: 25,S$GLB,, | Performed by: OPHTHALMOLOGY

## 2024-01-23 PROCEDURE — 99999 PR PBB SHADOW E&M-EST. PATIENT-LVL III: CPT | Mod: PBBFAC,,, | Performed by: OPHTHALMOLOGY

## 2024-02-19 PROBLEM — H36.821: Status: ACTIVE | Noted: 2024-02-19

## 2024-02-19 NOTE — PROGRESS NOTES
HPI    4-6 wk DFE/OCT OU poss Avastin OD   DLS- 01/23/2024 Dr. Jones     Pt sts OD vision still looks as if looking through a film or water spot.   OS vision still stable but tearing often.   Denies any eye pain   (+)Flashes OD (+)Floaters unsure if seeing floaters or blood ?  (+)Photophobia  (+)Glare      Last edited by Virgie Solis on 2/20/2024 10:00 AM.           A/P    ICD-10-CM ICD-9-CM   1. Proliferative sickle cell retinopathy of right eye  H36.821 282.60     362.29   2. Vitreous hemorrhage, right  H43.11 379.23   3. Age-related nuclear cataract of both eyes  H25.13 366.16   4. Hypertensive retinopathy of both eyes  H35.033 362.11         1. 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 Vitreous hemorrhage, right  Here for retina/VH f/u    No trauma preceding, has sickle trait    FA 1/11/24 did not show nate leakage but had significant heme blocking     S/p OZZY 1/11/24  S/p PRP 1/23/24    Today 2/20/2024   VA 20/25 (was 20/70), pt feels heme is better, has old heme on exam that is better, nasal laser with regressed NV area  Plan:   Observe for now, will recheck in 2-3 mo and consider repeat FA at that time once heme clears more    Visit today is associated with current or anticipated ongoing medical care related to this patients single serious condition/complex condition (proliferative sickle cell retinopathy of right eye with vitreous hemorrhage)     3. Age-related nuclear cataract of both eyes  Significant NS/CC changes od>os  Plan: Pt bothered by cataract, will refer for CEIOL eval, will benefit also so we can have better view to posterior segment in case we have to do more laser     4. Hypertensive retinopathy of both eyes  PCP Brenden Neal MD (Inactive), outside primaryy care notes reviewed   02/01/2022  6.2  A1C   Plan: Observation mod HTN changes  Recommend good blood pressure control, tight blood glucose control, and good cholesterol control     RTC 2 mo DFE/OCTm OU, possible  Avastin OD , consider repeat FA   RTC JOAQUIN garcia     I saw and examined the patient and reviewed in detail the findings documented. The final examination findings, image interpretations which have been independently interpreted, and plan as documented in the record represent my personal judgment and conclusions.    Hira Jones MD  Vitreoretinal Surgery   [facility]

## 2024-02-20 ENCOUNTER — OFFICE VISIT (OUTPATIENT)
Dept: OPHTHALMOLOGY | Facility: CLINIC | Age: 59
End: 2024-02-20
Payer: COMMERCIAL

## 2024-02-20 DIAGNOSIS — H35.033 HYPERTENSIVE RETINOPATHY OF BOTH EYES: ICD-10-CM

## 2024-02-20 DIAGNOSIS — H25.13 AGE-RELATED NUCLEAR CATARACT OF BOTH EYES: ICD-10-CM

## 2024-02-20 DIAGNOSIS — H36.821: Primary | ICD-10-CM

## 2024-02-20 DIAGNOSIS — H43.11 VITREOUS HEMORRHAGE, RIGHT: ICD-10-CM

## 2024-02-20 PROCEDURE — 99999 PR PBB SHADOW E&M-EST. PATIENT-LVL III: CPT | Mod: PBBFAC,,, | Performed by: OPHTHALMOLOGY

## 2024-02-20 PROCEDURE — 1160F RVW MEDS BY RX/DR IN RCRD: CPT | Mod: CPTII,S$GLB,, | Performed by: OPHTHALMOLOGY

## 2024-02-20 PROCEDURE — 1159F MED LIST DOCD IN RCRD: CPT | Mod: CPTII,S$GLB,, | Performed by: OPHTHALMOLOGY

## 2024-02-20 PROCEDURE — 99214 OFFICE O/P EST MOD 30 MIN: CPT | Mod: S$GLB,,, | Performed by: OPHTHALMOLOGY

## 2024-02-20 PROCEDURE — 92134 CPTRZ OPH DX IMG PST SGM RTA: CPT | Mod: S$GLB,,, | Performed by: OPHTHALMOLOGY

## 2024-02-20 PROCEDURE — G2211 COMPLEX E/M VISIT ADD ON: HCPCS | Mod: S$GLB,,, | Performed by: OPHTHALMOLOGY

## 2024-02-20 PROCEDURE — 92250 FUNDUS PHOTOGRAPHY W/I&R: CPT | Mod: 59,S$GLB,, | Performed by: OPHTHALMOLOGY

## 2024-02-22 ENCOUNTER — TELEPHONE (OUTPATIENT)
Dept: OPHTHALMOLOGY | Facility: CLINIC | Age: 59
End: 2024-02-22
Payer: COMMERCIAL

## 2024-02-22 NOTE — TELEPHONE ENCOUNTER
----- Message from Latrice Noonan sent at 2/20/2024  4:50 PM CST -----  Regarding: FW: appt    ----- Message -----  From: Tram Cantrell  Sent: 2/20/2024   4:35 PM CST  To: Liseth Solano Staff  Subject: appt                                             Pt needs JOAQUIN Oconnell

## 2024-02-22 NOTE — TELEPHONE ENCOUNTER
Clinic left voicemail for patient to schedule cataract evaluation with Dr. Solano; pt referred by Dr. Jones.

## 2024-04-15 NOTE — PROGRESS NOTES
HPI     2 mo DFE/OCTm OU /Possible Avastin OD ( Consider repeat FA?)    DLS 01/11/2024 by  Dr. ROSA Jones MD    CC: pt reports : Vision blurry and cloudy OD and floaters are still   present    ++blurred Va  --eye pain  ++floaters w/o flashes/  --diplopia  --headaches    --curtain/shadows/veils    Eye Meds: NONE    POHx:   1. Retinal Tear OD   2. Proliferative sickle cell retinopathy of right eye  Last edited by Lori Rivas MA on 4/16/2024 10:57 AM.            A/P    ICD-10-CM ICD-9-CM   1. Proliferative sickle cell retinopathy of right eye  H36.821 282.60     362.29   2. Vitreous hemorrhage, right  H43.11 379.23   3. Age-related nuclear cataract of both eyes  H25.13 366.16   4. Hypertensive retinopathy of both eyes  H35.033 362.11       1. 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 Vitreous hemorrhage, right  Here for retina/VH f/u    No trauma preceding, has sickle trait    FA 1/11/24 did not show nate leakage but had significant heme blocking     S/p OZZY 1/11/24  S/p PRP 1/23/24    Today 4/15/2024   VA 20/20 (was 20/25), pt feels heme much better, nasal laser with regressed NV area no new NV  Plan:   Observe for now, will recheck in 6 mo and consider repeat FA at that time so pt has had CEIOL and view is clearer    Visit today is associated with current or anticipated ongoing medical care related to this patients single serious condition/complex condition (proliferative sickle cell retinopathy of right eye with vitreous hemorrhage)     3. Age-related nuclear cataract of both eyes  Significant NS/CC changes od>os  Plan: pt has appt upcoming for CEIOL mendoza garcia from retina perspective for CEIOL    4. Hypertensive retinopathy of both eyes  01/31/2022  6.2  A1C   Plan: Observation mod HTN changes  Recommend good blood pressure control, tight blood glucose control, and good cholesterol control     RTC 6 mo DFE/OCTm OU, possible Avastin OD , consider repeat FA        I saw and examined the patient and  reviewed in detail the findings documented. The final examination findings, image interpretations which have been independently interpreted, and plan as documented in the record represent my personal judgment and conclusions.    Hira Jones MD  Vitreoretinal Surgery   [facility]

## 2024-04-16 ENCOUNTER — OFFICE VISIT (OUTPATIENT)
Dept: OPHTHALMOLOGY | Facility: CLINIC | Age: 59
End: 2024-04-16
Payer: COMMERCIAL

## 2024-04-16 DIAGNOSIS — H25.13 AGE-RELATED NUCLEAR CATARACT OF BOTH EYES: ICD-10-CM

## 2024-04-16 DIAGNOSIS — H43.11 VITREOUS HEMORRHAGE, RIGHT: ICD-10-CM

## 2024-04-16 DIAGNOSIS — H36.821: Primary | ICD-10-CM

## 2024-04-16 DIAGNOSIS — H35.033 HYPERTENSIVE RETINOPATHY OF BOTH EYES: ICD-10-CM

## 2024-04-16 PROCEDURE — 92202 OPSCPY EXTND ON/MAC DRAW: CPT | Mod: S$GLB,,, | Performed by: OPHTHALMOLOGY

## 2024-04-16 PROCEDURE — 99999 PR PBB SHADOW E&M-EST. PATIENT-LVL III: CPT | Mod: PBBFAC,,, | Performed by: OPHTHALMOLOGY

## 2024-04-16 PROCEDURE — 1159F MED LIST DOCD IN RCRD: CPT | Mod: CPTII,S$GLB,, | Performed by: OPHTHALMOLOGY

## 2024-04-16 PROCEDURE — 92134 CPTRZ OPH DX IMG PST SGM RTA: CPT | Mod: S$GLB,,, | Performed by: OPHTHALMOLOGY

## 2024-04-16 PROCEDURE — 1160F RVW MEDS BY RX/DR IN RCRD: CPT | Mod: CPTII,S$GLB,, | Performed by: OPHTHALMOLOGY

## 2024-04-16 PROCEDURE — G2211 COMPLEX E/M VISIT ADD ON: HCPCS | Mod: S$GLB,,, | Performed by: OPHTHALMOLOGY

## 2024-04-16 PROCEDURE — 99213 OFFICE O/P EST LOW 20 MIN: CPT | Mod: S$GLB,,, | Performed by: OPHTHALMOLOGY

## 2024-04-25 ENCOUNTER — OFFICE VISIT (OUTPATIENT)
Dept: OPHTHALMOLOGY | Facility: CLINIC | Age: 59
End: 2024-04-25
Payer: COMMERCIAL

## 2024-04-25 DIAGNOSIS — H25.812 COMBINED FORM OF AGE-RELATED CATARACT, LEFT EYE: ICD-10-CM

## 2024-04-25 DIAGNOSIS — H43.11 VITREOUS HEMORRHAGE, RIGHT: ICD-10-CM

## 2024-04-25 DIAGNOSIS — H25.811 COMBINED FORM OF AGE-RELATED CATARACT, RIGHT EYE: Primary | ICD-10-CM

## 2024-04-25 DIAGNOSIS — H25.811 COMBINED FORMS OF AGE-RELATED CATARACT OF RIGHT EYE: ICD-10-CM

## 2024-04-25 DIAGNOSIS — H35.033 HYPERTENSIVE RETINOPATHY OF BOTH EYES: ICD-10-CM

## 2024-04-25 DIAGNOSIS — H36.821: ICD-10-CM

## 2024-04-25 PROCEDURE — 99999 PR PBB SHADOW E&M-EST. PATIENT-LVL III: CPT | Mod: PBBFAC,,, | Performed by: STUDENT IN AN ORGANIZED HEALTH CARE EDUCATION/TRAINING PROGRAM

## 2024-04-25 PROCEDURE — 92136 OPHTHALMIC BIOMETRY: CPT | Mod: RT,S$GLB,, | Performed by: STUDENT IN AN ORGANIZED HEALTH CARE EDUCATION/TRAINING PROGRAM

## 2024-04-25 PROCEDURE — 1159F MED LIST DOCD IN RCRD: CPT | Mod: CPTII,S$GLB,, | Performed by: STUDENT IN AN ORGANIZED HEALTH CARE EDUCATION/TRAINING PROGRAM

## 2024-04-25 PROCEDURE — 99213 OFFICE O/P EST LOW 20 MIN: CPT | Mod: S$GLB,,, | Performed by: STUDENT IN AN ORGANIZED HEALTH CARE EDUCATION/TRAINING PROGRAM

## 2024-04-25 NOTE — PROGRESS NOTES
HPI    58 year old female   Ref by Dr. Jones   Evaluate for cataract extraction-cleared for this from a retina standpoint     Pt complains of trouble with daily life activities (driving and seeing   objects at a distance)and glare     Retinal Tear OD   Vitreous Hemorrhage OD  Proliferative Sickle Cell Retinopathy OD     Last edited by Blaire Downey MA on 4/25/2024  1:31 PM.            Assessment /Plan     For exam results, see Encounter Report.    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 Visually significant cataract causing significant decrease in quality of life.  - Guttae, PXF, or phacodonesis: none  - H/o LASIK/PRK, RK, or retinal surgery: none  - Dilation: good  - Target: plano  - Astigmatism: none, no toric, understands need for reading glasses  - Special needs: Trypan  - Lens: OD DIBOO 20.5, MA60AC 20.5/19.5; OS 21.0, MA60AC 21.0/20.0    Risks, benefits, and alternatives discussed with the patient. As a benefit, I expect cataract surgery to resolve many of the current symptoms. Given the patient's limitation, we discussed alternatives in the management including observation with or without an updated refraction, and cataract surgery and the patient elects to proceed with surgery. We also reviewed the most common and most serious risks of cataract surgery, including infections (~1 in 4000), retinal detachment (~5 in 1000), retina/macular or corneal edema (delayed visual recovery), retained lens fragment (~1 in 200 may require another surgery or vitrectomy), vitreous loss, damage to the iris, possibility of not being able to implant a lens (aphakia, leading to a second surgery), and residual refractive error (which may may require  using glasses, contact lenses, laser or even a lens exchange. This has under 2% chance of being significant unless patient has had refractive surgery). The patient understands that this list is not all-inclusive and that unusual complications may arise after any surgical procedure. If the patient is an appropriate candidate, we discussed the possibility of multifocal and toric lenses. I explained that no lens option can guarantee full spectacle independence, especially for small print or low light conditions, but some offer less spectacle dependence than others. The patient expressed understanding of these risks, benefits and alternatives and desires to proceed with cataract surgery.    RTC OR TBD -- phaco/IOL OD  Lens: DIBOO 20.5, MA60AC 20.5/19.5  Trypan

## 2024-04-26 RX ORDER — MOXIFLOXACIN 5 MG/ML
1 SOLUTION/ DROPS OPHTHALMIC
Status: CANCELLED | OUTPATIENT
Start: 2024-04-26

## 2024-04-26 RX ORDER — SODIUM CHLORIDE 0.9 % (FLUSH) 0.9 %
10 SYRINGE (ML) INJECTION
Status: CANCELLED | OUTPATIENT
Start: 2024-04-26

## 2024-04-26 RX ORDER — CYCLOP/TROP/PROPA/PHEN/KET/WAT 1-1-0.1%
1 DROPS (EA) OPHTHALMIC (EYE)
Status: CANCELLED | OUTPATIENT
Start: 2024-04-26

## 2024-04-26 RX ORDER — PREDNISOLONE ACETATE 10 MG/ML
1 SUSPENSION/ DROPS OPHTHALMIC
Status: CANCELLED | OUTPATIENT
Start: 2024-04-26

## 2024-05-07 ENCOUNTER — TELEPHONE (OUTPATIENT)
Dept: OPHTHALMOLOGY | Facility: CLINIC | Age: 59
End: 2024-05-07
Payer: COMMERCIAL

## 2024-05-07 NOTE — TELEPHONE ENCOUNTER
----- Message from Latrice Noonan sent at 5/7/2024  1:38 PM CDT -----  Regarding: Surgery    ----- Message -----  From: Sherrie Clark  Sent: 5/7/2024  10:39 AM CDT  To: Liseth Solano Staff    [employer] calling in regards to incorrect deductible  price , pt was told 1,000, actual price is 250.00          [facility]  Provider services number  [phone]

## 2024-05-13 NOTE — PRE-PROCEDURE INSTRUCTIONS
Unable to reach pt via phone.  Left voicemail with arrival time also informing pt of need for responsible  accompaniment and instructing pt to follow pre-procedure instructions provided via [facility] portal.  The following message was sent to pt's portal.      Dear Ryanne     Below you will find basic pre-procedure instructions in preparation for your procedure on 5/14/24 with Dr. Thomas     You should have received your arrival time already from Dr's office.     - Nothing to eat or drink after midnight the night before your procedure until after your procedure, except AM meds with small sips of water.     - HOLD all oral Diabetic medications night before and morning of procedure  - HOLD all Insulin morning of procedure  - HOLD all Fluid pills morning of procedure  - HOLD all non-insulin shots until after surgery (Ozempic, Mounjaro, Trulicity, Victoza, Byetta, Wegovy and Adlyxin) (7 days prior)  - HOLD all vitamins, minerals and herbal supplements morning of procedure   - TAKE all B/P meds, EXCEPT those that contain a fluid pill (ex. Lasix, Hydroclorothiazide/HCTZ, Spirnolactone)  - USE inhalers as needed and bring AM of surgery  - USE EYE DROPS as directed  -TAKE blood thinner meds AM of surgery unless otherwise instructed by your provider to not take     - Shower and wash face with dial soap for 3 mins PM prior and AM of surgery  - No powder, lotions, creams, oils, gels, ointments, makeup,  or jewelry    - Wear comfortable clothing (button up shirt)     (Patient is required to have a responsible ride to transport home, ride may not leave while patient is in surgery)     -- [facility], 2nd floor Surgery Center, located   @ [address] [place] [address]  2nd Floor Registration        If you have any questions or concerns please feel free to contact your surgeon's office.           Please reply to this message as receipt of delivery.     [place], LPN [facility]  Pre-Admit - Anesthesia Dept

## 2024-05-14 ENCOUNTER — HOSPITAL ENCOUNTER (OUTPATIENT)
Facility: HOSPITAL | Age: 59
Discharge: HOME | End: 2024-05-14
Attending: STUDENT IN AN ORGANIZED HEALTH CARE EDUCATION/TRAINING PROGRAM | Admitting: STUDENT IN AN ORGANIZED HEALTH CARE EDUCATION/TRAINING PROGRAM
Payer: COMMERCIAL

## 2024-05-14 ENCOUNTER — ANESTHESIA (OUTPATIENT)
Dept: SURGERY | Facility: HOSPITAL | Age: 59
End: 2024-05-14
Payer: COMMERCIAL

## 2024-05-14 ENCOUNTER — ANESTHESIA EVENT (OUTPATIENT)
Dept: SURGERY | Facility: HOSPITAL | Age: 59
End: 2024-05-14
Payer: COMMERCIAL

## 2024-05-14 VITALS
HEART RATE: 65 BPM | DIASTOLIC BLOOD PRESSURE: 76 MMHG | HEIGHT: 62 IN | RESPIRATION RATE: 18 BRPM | TEMPERATURE: 98 F | WEIGHT: 183 LBS | SYSTOLIC BLOOD PRESSURE: 120 MMHG | OXYGEN SATURATION: 96 % | BODY MASS INDEX: 33.68 KG/M2

## 2024-05-14 DIAGNOSIS — H25.811 COMBINED FORMS OF AGE-RELATED CATARACT OF RIGHT EYE: Primary | ICD-10-CM

## 2024-05-14 PROCEDURE — 37000009 HC ANESTHESIA EA ADD 15 MINS: Performed by: STUDENT IN AN ORGANIZED HEALTH CARE EDUCATION/TRAINING PROGRAM

## 2024-05-14 PROCEDURE — 36000707: Performed by: STUDENT IN AN ORGANIZED HEALTH CARE EDUCATION/TRAINING PROGRAM

## 2024-05-14 PROCEDURE — 37000008 HC ANESTHESIA 1ST 15 MINUTES: Performed by: STUDENT IN AN ORGANIZED HEALTH CARE EDUCATION/TRAINING PROGRAM

## 2024-05-14 PROCEDURE — 25000003 PHARM REV CODE 250: Performed by: STUDENT IN AN ORGANIZED HEALTH CARE EDUCATION/TRAINING PROGRAM

## 2024-05-14 PROCEDURE — 36000706: Performed by: STUDENT IN AN ORGANIZED HEALTH CARE EDUCATION/TRAINING PROGRAM

## 2024-05-14 PROCEDURE — 94761 N-INVAS EAR/PLS OXIMETRY MLT: CPT

## 2024-05-14 PROCEDURE — V2632 POST CHMBR INTRAOCULAR LENS: HCPCS | Performed by: STUDENT IN AN ORGANIZED HEALTH CARE EDUCATION/TRAINING PROGRAM

## 2024-05-14 PROCEDURE — 63600175 PHARM REV CODE 636 W HCPCS: Performed by: NURSE ANESTHETIST, CERTIFIED REGISTERED

## 2024-05-14 PROCEDURE — 99900035 HC TECH TIME PER 15 MIN (STAT)

## 2024-05-14 PROCEDURE — 71000015 HC POSTOP RECOV 1ST HR: Performed by: STUDENT IN AN ORGANIZED HEALTH CARE EDUCATION/TRAINING PROGRAM

## 2024-05-14 PROCEDURE — D9220A PRA ANESTHESIA: Mod: ,,, | Performed by: NURSE ANESTHETIST, CERTIFIED REGISTERED

## 2024-05-14 PROCEDURE — 63600175 PHARM REV CODE 636 W HCPCS: Performed by: STUDENT IN AN ORGANIZED HEALTH CARE EDUCATION/TRAINING PROGRAM

## 2024-05-14 PROCEDURE — 66984 XCAPSL CTRC RMVL W/O ECP: CPT | Mod: RT,,, | Performed by: STUDENT IN AN ORGANIZED HEALTH CARE EDUCATION/TRAINING PROGRAM

## 2024-05-14 DEVICE — LENS EYHANCE +21.0D: Type: IMPLANTABLE DEVICE | Site: EYE | Status: FUNCTIONAL

## 2024-05-14 RX ORDER — KETOROLAC TROMETHAMINE 5 MG/ML
1 SOLUTION OPHTHALMIC 4 TIMES DAILY
Qty: 5 ML | Refills: 1 | Status: SHIPPED | OUTPATIENT
Start: 2024-05-14

## 2024-05-14 RX ORDER — TROPICAMIDE 10 MG/ML
1 SOLUTION/ DROPS OPHTHALMIC
Status: COMPLETED | OUTPATIENT
Start: 2024-05-14 | End: 2024-05-14

## 2024-05-14 RX ORDER — PREDNISOLONE ACETATE 10 MG/ML
1 SUSPENSION/ DROPS OPHTHALMIC 4 TIMES DAILY
Qty: 5 ML | Refills: 1 | Status: SHIPPED | OUTPATIENT
Start: 2024-05-14 | End: 2025-05-14

## 2024-05-14 RX ORDER — SODIUM CHLORIDE 0.9 % (FLUSH) 0.9 %
2 SYRINGE (ML) INJECTION
Status: DISCONTINUED | OUTPATIENT
Start: 2024-05-14 | End: 2024-05-14 | Stop reason: HOSPADM

## 2024-05-14 RX ORDER — SODIUM CHLORIDE 0.9 % (FLUSH) 0.9 %
10 SYRINGE (ML) INJECTION
Status: DISCONTINUED | OUTPATIENT
Start: 2024-05-14 | End: 2024-05-14 | Stop reason: HOSPADM

## 2024-05-14 RX ORDER — LIDOCAINE HYDROCHLORIDE 10 MG/ML
INJECTION, SOLUTION EPIDURAL; INFILTRATION; INTRACAUDAL; PERINEURAL
Status: DISCONTINUED | OUTPATIENT
Start: 2024-05-14 | End: 2024-05-14 | Stop reason: HOSPADM

## 2024-05-14 RX ORDER — CYCLOPENTOLATE HYDROCHLORIDE 10 MG/ML
1 SOLUTION/ DROPS OPHTHALMIC
Status: COMPLETED | OUTPATIENT
Start: 2024-05-14 | End: 2024-05-14

## 2024-05-14 RX ORDER — MIDAZOLAM HYDROCHLORIDE 1 MG/ML
INJECTION, SOLUTION INTRAMUSCULAR; INTRAVENOUS
Status: DISCONTINUED | OUTPATIENT
Start: 2024-05-14 | End: 2024-05-14

## 2024-05-14 RX ORDER — MOXIFLOXACIN 5 MG/ML
1 SOLUTION/ DROPS OPHTHALMIC
Status: COMPLETED | OUTPATIENT
Start: 2024-05-14 | End: 2024-05-14

## 2024-05-14 RX ORDER — LIDOCAINE HYDROCHLORIDE 40 MG/ML
INJECTION, SOLUTION RETROBULBAR
Status: DISCONTINUED | OUTPATIENT
Start: 2024-05-14 | End: 2024-05-14 | Stop reason: HOSPADM

## 2024-05-14 RX ORDER — FENTANYL CITRATE 50 UG/ML
INJECTION, SOLUTION INTRAMUSCULAR; INTRAVENOUS
Status: DISCONTINUED | OUTPATIENT
Start: 2024-05-14 | End: 2024-05-14

## 2024-05-14 RX ORDER — PREDNISOLONE ACETATE 10 MG/ML
1 SUSPENSION/ DROPS OPHTHALMIC
Status: DISCONTINUED | OUTPATIENT
Start: 2024-05-14 | End: 2024-05-14 | Stop reason: HOSPADM

## 2024-05-14 RX ORDER — PREDNISOLONE ACETATE 10 MG/ML
SUSPENSION/ DROPS OPHTHALMIC
Status: DISCONTINUED | OUTPATIENT
Start: 2024-05-14 | End: 2024-05-14 | Stop reason: HOSPADM

## 2024-05-14 RX ORDER — PHENYLEPHRINE HYDROCHLORIDE 25 MG/ML
1 SOLUTION/ DROPS OPHTHALMIC
Status: COMPLETED | OUTPATIENT
Start: 2024-05-14 | End: 2024-05-14

## 2024-05-14 RX ORDER — TETRACAINE HYDROCHLORIDE 5 MG/ML
SOLUTION OPHTHALMIC
Status: DISCONTINUED | OUTPATIENT
Start: 2024-05-14 | End: 2024-05-14 | Stop reason: HOSPADM

## 2024-05-14 RX ORDER — TETRACAINE HYDROCHLORIDE 5 MG/ML
1 SOLUTION OPHTHALMIC
Status: COMPLETED | OUTPATIENT
Start: 2024-05-14 | End: 2024-05-14

## 2024-05-14 RX ORDER — MOXIFLOXACIN 5 MG/ML
SOLUTION/ DROPS OPHTHALMIC
Status: DISCONTINUED | OUTPATIENT
Start: 2024-05-14 | End: 2024-05-14 | Stop reason: HOSPADM

## 2024-05-14 RX ORDER — LIDOCAIN/PHENYLEPH/BSS NO.2/PF 1 %-1.5 %
SYRINGE (ML) INTRAOCULAR
Status: DISCONTINUED | OUTPATIENT
Start: 2024-05-14 | End: 2024-05-14 | Stop reason: HOSPADM

## 2024-05-14 RX ADMIN — MOXIFLOXACIN OPHTHALMIC 1 DROP: 5 SOLUTION/ DROPS OPHTHALMIC at 08:05

## 2024-05-14 RX ADMIN — TROPICAMIDE 1 DROP: 10 SOLUTION/ DROPS OPHTHALMIC at 08:05

## 2024-05-14 RX ADMIN — TETRACAINE HYDROCHLORIDE 1 DROP: 5 SOLUTION/ DROPS OPHTHALMIC at 08:05

## 2024-05-14 RX ADMIN — PREDNISOLONE ACETATE 1 DROP: 10 SUSPENSION/ DROPS OPHTHALMIC at 08:05

## 2024-05-14 RX ADMIN — PHENYLEPHRINE HYDROCHLORIDE 1 DROP: 25 SOLUTION/ DROPS OPHTHALMIC at 08:05

## 2024-05-14 RX ADMIN — CYCLOPENTOLATE HYDROCHLORIDE 1 DROP: 10 SOLUTION/ DROPS OPHTHALMIC at 08:05

## 2024-05-14 RX ADMIN — FENTANYL CITRATE 50 MCG: 50 INJECTION, SOLUTION INTRAMUSCULAR; INTRAVENOUS at 08:05

## 2024-05-14 RX ADMIN — MIDAZOLAM HYDROCHLORIDE 1 MG: 1 INJECTION, SOLUTION INTRAMUSCULAR; INTRAVENOUS at 08:05

## 2024-05-14 NOTE — ANESTHESIA POSTPROCEDURE EVALUATION
Anesthesia Post Evaluation    Patient: Ryanne Mccauley    Procedure(s) Performed: Procedure(s) (LRB):  PHACOEMULSIFICATION, CATARACT (Right)  INSERTION, IOL PROSTHESIS (Right)    Final Anesthesia Type: MAC      Patient location during evaluation: PACU  Patient participation: Yes- Able to Participate  Level of consciousness: awake and alert  Post-procedure vital signs: reviewed and stable  Pain management: adequate  Airway patency: patent    PONV status at discharge: No PONV  Anesthetic complications: no      Cardiovascular status: stable  Respiratory status: spontaneous ventilation  Hydration status: euvolemic  Follow-up not needed.          Vitals Value Taken Time   /76 05/14/24 0926   Temp 36.7 °C (98.1 °F) 05/14/24 0913   Pulse 65 05/14/24 0925   Resp 18 05/14/24 0926   SpO2 96 % 05/14/24 0925   Vitals shown include unfiled device data.      No case tracking events are documented in the log.      Pain/Laverne Score: Laverne Score: 10 (5/14/2024  9:26 AM)

## 2024-05-14 NOTE — OP NOTE
DATE OF SURGERY: 5/14/2024    PREOPERATIVE DIAGNOSES:  1. Visually significant combined form cataract, right eye    POSTOPERATIVE DIAGNOSES:  Same    PROCEDURES PERFORMED:  Cataract extraction with intraocular lens implant, right eye    ATTENDING SURGEON:  Liseth Solano M.D.    ANESTHESIA:  MAC    COMPLICATIONS:  None.    IMPLANTS:   Implant Name Type Inv. Item Serial No.  Lot No. LRB No. Used Action   LENS EYHANCE +21.0D - [id number]  LENS EYHANCE +21.0D [id number] [facility]  Right 1 Implanted       JUSTIFICATION OF SURGERY:     Ryanne Mccauley is a 58 y.o. female with a history and physical exam consistent with a visually significant cataract. We discussed her medical conditions and treatment options, including the risks, benefits, and alternatives of surgery. she expressed her understanding of the risks, benefits, and alternatives to the procedure including, but not limited to infection, bleeding, loss of vision, loss of eye, corneal edema, need for glasses, and need for further surgical intervention. After answering all her questions, there was an understanding of the issues involved with surgery and the consent was signed.    PROCEDURE:  Local anesthesia  Betadine paint and drop in holding room   Prep and drape in usual manner in OR  Time out according to protocol  Nasal lid speculum placed  Paracentesis made with sideport blade  Lidocaine injected. Shugarcaine, sterile air and trypan blue injected and rinsed out with BSS and Viscoelastic injected  Clear corneal incision made with 2.5 mm keratome blade  Continuous curvilinear capsulorrhexis created using a prebent cystotome and Utrata forceps  Gentle hydrodissection until nucleus was mobile  Phacoemulsification tip used to disassemble the lens and remove it  Removal of remaining cortical material and epinuclear shell with the irrigation and aspiration handpiece  Capsular bag inflated with Provisc  Intraocular lens  injected into the capsular bag and centered  Viscoelastic removed with the irrigation and aspiration handpiece  Both wounds hydrated, wounds checked and found to be watertight  Speculum removed from the eye  Anti-inflammatory and antibiotic drops instilled into the eye  Patch and plastic shield placed on the eye    The patient tolerated the procedure well. There were no complications and the patient left the operating room in good condition. Arrangements were made for the patient to be seen in the outpatient clinic on the first postoperative day.

## 2024-05-14 NOTE — ANESTHESIA PREPROCEDURE EVALUATION
05/14/2024  Ryanne Mccauley is a 58 y.o., female.      Pre-op Assessment    I have reviewed the Patient Summary Reports.     I have reviewed the Nursing Notes. I have reviewed the NPO Status.   I have reviewed the Medications.     Review of Systems  Anesthesia Hx:             Denies Family Hx of Anesthesia complications.    Denies Personal Hx of Anesthesia complications.                    Cardiovascular:     Hypertension   CAD                                            Physical Exam    Airway:  Mallampati: II     Anesthesia Plan  Type of Anesthesia, risks & benefits discussed:    Anesthesia Type: MAC  Intra-op Monitoring Plan: Standard ASA Monitors  Induction:  IV  Informed Consent: Informed consent signed with the Patient and all parties understand the risks and agree with anesthesia plan.  All questions answered.   ASA Score: 3  Day of Surgery Review of History & Physical: H&P Update referred to the surgeon/provider.    Ready For Surgery From Anesthesia Perspective.   .

## 2024-05-14 NOTE — TRANSFER OF CARE
"Anesthesia Transfer of Care Note    Patient: Ryanne Mccauley    Procedure(s) Performed: Procedure(s) (LRB):  PHACOEMULSIFICATION, CATARACT (Right)  INSERTION, IOL PROSTHESIS (Right)    Patient location: PACU    Anesthesia Type: MAC    Transport from OR: Transported from OR on room air with adequate spontaneous ventilation    Post pain: adequate analgesia    Post assessment: no apparent anesthetic complications and tolerated procedure well    Post vital signs: stable    Level of consciousness: awake, alert and oriented    Nausea/Vomiting: no nausea/vomiting    Complications: none    Transfer of care protocol was followed      Last vitals: Visit Vitals  BP (!) 151/88 (BP Location: Left arm, Patient Position: Lying)   Pulse 69   Temp 36.6 °C (97.9 °F) (Oral)   Resp 16   Ht 5' 2" (1.575 m)   Wt 83 kg (183 lb)   SpO2 100%   Breastfeeding No   BMI 33.47 kg/m²     "

## 2024-05-14 NOTE — INTERVAL H&P NOTE
BRIEF HISTORY, PERTINENT EXAM:  H&P reviewed. No changes.    ASSESSMENT/PLAN:  Proceed with surgery as planned -- phaco/IOL right eye  Review of calculations on Veracity -- change IOL to DIBOO 21.0 as primary lens right eye    Liseth Solano MD

## 2024-05-14 NOTE — DISCHARGE SUMMARY
[facility]  Discharge Note  Short Stay    Procedure(s) (LRB):  PHACOEMULSIFICATION, CATARACT (Right)  INSERTION, IOL PROSTHESIS (Right)    OUTCOME: Patient tolerated treatment/procedure well without complication and is now ready for discharge.    DISPOSITION: Home or Self Care    FINAL DIAGNOSIS:  Combined forms of age-related cataract of right eye    FOLLOWUP: In clinic    DISCHARGE INSTRUCTIONS:  No discharge procedures on file.     TIME SPENT ON DISCHARGE: 5 minutes

## 2024-05-15 ENCOUNTER — OFFICE VISIT (OUTPATIENT)
Dept: OPHTHALMOLOGY | Facility: CLINIC | Age: 59
End: 2024-05-15
Payer: COMMERCIAL

## 2024-05-15 DIAGNOSIS — Z96.1 STATUS POST CATARACT EXTRACTION AND INSERTION OF INTRAOCULAR LENS, RIGHT: Primary | ICD-10-CM

## 2024-05-15 DIAGNOSIS — H25.812 COMBINED FORM OF AGE-RELATED CATARACT, LEFT EYE: ICD-10-CM

## 2024-05-15 DIAGNOSIS — Z98.41 STATUS POST CATARACT EXTRACTION AND INSERTION OF INTRAOCULAR LENS, RIGHT: Primary | ICD-10-CM

## 2024-05-15 PROCEDURE — 99024 POSTOP FOLLOW-UP VISIT: CPT | Mod: S$GLB,,, | Performed by: STUDENT IN AN ORGANIZED HEALTH CARE EDUCATION/TRAINING PROGRAM

## 2024-05-15 PROCEDURE — 99999 PR PBB SHADOW E&M-EST. PATIENT-LVL I: CPT | Mod: PBBFAC,,, | Performed by: STUDENT IN AN ORGANIZED HEALTH CARE EDUCATION/TRAINING PROGRAM

## 2024-05-15 NOTE — PROGRESS NOTES
HPI    Cataract extraction with intraocular lens implant, right eye 05/14/2024  1 day post op   Doing well     Last edited by Blaire Downey MA on 5/15/2024 11:40 AM.            Assessment /Plan     For exam results, see Encounter Report.    Status post cataract extraction and insertion of intraocular lens, right    Combined form of age-related cataract, left eye      05/15/2024  POD#1 s/p phaco/IOL OD 5/14/24  Doing well    - Continue PF/Acular 4-3-2-1 OD qweekly then stop  - Continue Moxi 4x OD x 7days then stop    RTC POW#1 -- [facility], IOP

## 2024-05-21 ENCOUNTER — OFFICE VISIT (OUTPATIENT)
Dept: OPHTHALMOLOGY | Facility: CLINIC | Age: 59
End: 2024-05-21
Payer: COMMERCIAL

## 2024-05-21 DIAGNOSIS — Z96.1 STATUS POST CATARACT EXTRACTION AND INSERTION OF INTRAOCULAR LENS, RIGHT: Primary | ICD-10-CM

## 2024-05-21 DIAGNOSIS — H35.033 HYPERTENSIVE RETINOPATHY OF BOTH EYES: ICD-10-CM

## 2024-05-21 DIAGNOSIS — H25.812 COMBINED FORM OF AGE-RELATED CATARACT, LEFT EYE: ICD-10-CM

## 2024-05-21 DIAGNOSIS — Z98.41 STATUS POST CATARACT EXTRACTION AND INSERTION OF INTRAOCULAR LENS, RIGHT: Primary | ICD-10-CM

## 2024-05-21 DIAGNOSIS — H43.11 VITREOUS HEMORRHAGE, RIGHT: ICD-10-CM

## 2024-05-21 DIAGNOSIS — H36.821: ICD-10-CM

## 2024-05-21 PROCEDURE — 1159F MED LIST DOCD IN RCRD: CPT | Mod: CPTII,S$GLB,, | Performed by: STUDENT IN AN ORGANIZED HEALTH CARE EDUCATION/TRAINING PROGRAM

## 2024-05-21 PROCEDURE — 99024 POSTOP FOLLOW-UP VISIT: CPT | Mod: S$GLB,,, | Performed by: STUDENT IN AN ORGANIZED HEALTH CARE EDUCATION/TRAINING PROGRAM

## 2024-05-21 PROCEDURE — 99999 PR PBB SHADOW E&M-EST. PATIENT-LVL III: CPT | Mod: PBBFAC,,, | Performed by: STUDENT IN AN ORGANIZED HEALTH CARE EDUCATION/TRAINING PROGRAM

## 2024-05-21 NOTE — PROGRESS NOTES
Assessment /Plan     For exam results, see Encounter Report.    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

## 2024-06-14 ENCOUNTER — OFFICE VISIT (OUTPATIENT)
Dept: OPHTHALMOLOGY | Facility: CLINIC | Age: 59
End: 2024-06-14
Payer: COMMERCIAL

## 2024-06-14 DIAGNOSIS — Z96.1 STATUS POST CATARACT EXTRACTION AND INSERTION OF INTRAOCULAR LENS, RIGHT: Primary | ICD-10-CM

## 2024-06-14 DIAGNOSIS — H25.812 COMBINED FORM OF AGE-RELATED CATARACT, LEFT EYE: ICD-10-CM

## 2024-06-14 DIAGNOSIS — Z98.41 STATUS POST CATARACT EXTRACTION AND INSERTION OF INTRAOCULAR LENS, RIGHT: Primary | ICD-10-CM

## 2024-06-14 PROCEDURE — 99024 POSTOP FOLLOW-UP VISIT: CPT | Mod: S$GLB,,, | Performed by: STUDENT IN AN ORGANIZED HEALTH CARE EDUCATION/TRAINING PROGRAM

## 2024-06-14 PROCEDURE — 99999 PR PBB SHADOW E&M-EST. PATIENT-LVL I: CPT | Mod: PBBFAC,,, | Performed by: STUDENT IN AN ORGANIZED HEALTH CARE EDUCATION/TRAINING PROGRAM

## 2024-06-14 RX ORDER — MOXIFLOXACIN 5 MG/ML
1 SOLUTION/ DROPS OPHTHALMIC
OUTPATIENT
Start: 2024-06-14

## 2024-06-14 RX ORDER — CYCLOP/TROP/PROPA/PHEN/KET/WAT 1-1-0.1%
1 DROPS (EA) OPHTHALMIC (EYE)
OUTPATIENT
Start: 2024-06-14

## 2024-06-14 RX ORDER — PREDNISOLONE ACETATE 10 MG/ML
1 SUSPENSION/ DROPS OPHTHALMIC
OUTPATIENT
Start: 2024-06-14

## 2024-06-14 RX ORDER — SODIUM CHLORIDE 0.9 % (FLUSH) 0.9 %
10 SYRINGE (ML) INJECTION
OUTPATIENT
Start: 2024-06-14

## 2024-06-14 NOTE — H&P (VIEW-ONLY)
HPI    DLS: 05/21/2024 Dr Solano    Today one month post op   Pt states that her vision is good but she had some itching in that eye and   wondered if she could use artificial tears.    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            Assessment /Plan     For exam results, see Encounter Report.    Status post cataract extraction and insertion of intraocular lens, right    Combined form of age-related cataract, left eye      Patient is interested in cataract surgery; interfering with activities of daily living. Visually significant cataract causing significant decrease in quality of life.  - Guttae, PXF, or phacodonesis: none  - H/o LASIK/PRK, RK, or retinal surgery: none  - Dilation: good  - Target: plano  - Astigmatism: none, no toric, understands need for reading glasses  - Special needs: Trypan  - Lens: OS DIBOO 21.0, MA60AC 21.0/20.0    Risks, benefits, and alternatives discussed with the patient. As a benefit, I expect cataract surgery to resolve many of the current symptoms. Given the patient's limitation, we discussed alternatives in the management including observation with or without an updated refraction, and cataract surgery and the patient elects to proceed with surgery. We also reviewed the most common and most serious risks of cataract surgery, including infections (~1 in 4000), retinal detachment (~5 in 1000), retina/macular or corneal edema (delayed visual recovery), retained lens fragment (~1 in 200 may require another surgery or vitrectomy), vitreous loss, damage to the iris, possibility of not being able to implant a lens  (aphakia, leading to a second surgery), and residual refractive error (which may may require using glasses, contact lenses, laser or even a lens exchange. This has under 2% chance of being significant unless patient has had refractive surgery). The patient understands that this list is not all-inclusive and that unusual complications may arise after any surgical procedure. If the patient is an appropriate candidate, we discussed the possibility of multifocal and toric lenses. I explained that no lens option can guarantee full spectacle independence, especially for small print or low light conditions, but some offer less spectacle dependence than others. The patient expressed understanding of these risks, benefits and alternatives and desires to proceed with cataract surgery.    RTC OR July 9 [facility] -- phaco/IOL OS  Lens: DIBOO 21.0, MA60AC 21.0/20.0  Trypan

## 2024-07-08 ENCOUNTER — TELEPHONE (OUTPATIENT)
Dept: OPHTHALMOLOGY | Facility: CLINIC | Age: 59
End: 2024-07-08
Payer: COMMERCIAL

## 2024-07-08 NOTE — PRE-PROCEDURE INSTRUCTIONS
Unable to reach pt via phone.  Left voicemail with arrival time also informing pt of need for responsible  accompaniment and instructing pt to follow pre-procedure instructions provided via [facility] portal.  The following message was sent to pt's portal.      Dear Ryanne     Below you will find basic pre-procedure instructions in preparation for your procedure on 7/9/24 with Dr. Solano     You should have received your arrival time already from Dr's office.     - Nothing to eat or drink after midnight the night before your procedure until after your procedure, except AM meds with small sips of water.     - HOLD all oral Diabetic medications night before and morning of procedure  - HOLD all Insulin morning of procedure  - HOLD all Fluid pills morning of procedure  - HOLD all non-insulin shots until after surgery (Ozempic, Mounjaro, Trulicity, Victoza, Byetta, Wegovy and Adlyxin) (7 days prior)  - HOLD all vitamins, minerals and herbal supplements morning of procedure   - TAKE all B/P meds, EXCEPT those that contain a fluid pill (ex. Lasix, Hydroclorothiazide/HCTZ, Spirnolactone)  - USE inhalers as needed and bring AM of surgery  - USE EYE DROPS as directed  -TAKE blood thinner meds AM of surgery unless otherwise instructed by your provider to not take     - Shower and wash face with antibacterial soap (ex. Dial) for 3 mins PM prior and AM of surgery  - No powder, lotions, creams, oils, gels, ointments, makeup,  or jewelry    - Wear comfortable clothing (button up shirt)     (Patient is required to have a responsible ride to transport home, ride may not leave while patient is in surgery)     -- [facility], 2nd floor Surgery Center, located   @ [address] [place] [id number]  2nd Floor Registration        If you have any questions or concerns please feel free to contact your surgeon's office.           Please reply to this message as receipt of delivery.     [place], LPN [facility]  Pre-Admit - Anesthesia Dept

## 2024-07-09 ENCOUNTER — HOSPITAL ENCOUNTER (OUTPATIENT)
Facility: HOSPITAL | Age: 59
Discharge: HOME | End: 2024-07-09
Attending: STUDENT IN AN ORGANIZED HEALTH CARE EDUCATION/TRAINING PROGRAM | Admitting: STUDENT IN AN ORGANIZED HEALTH CARE EDUCATION/TRAINING PROGRAM
Payer: COMMERCIAL

## 2024-07-09 ENCOUNTER — ANESTHESIA (OUTPATIENT)
Dept: SURGERY | Facility: HOSPITAL | Age: 59
End: 2024-07-09
Payer: COMMERCIAL

## 2024-07-09 ENCOUNTER — TELEPHONE (OUTPATIENT)
Dept: OPHTHALMOLOGY | Facility: CLINIC | Age: 59
End: 2024-07-09
Payer: COMMERCIAL

## 2024-07-09 ENCOUNTER — ANESTHESIA EVENT (OUTPATIENT)
Dept: SURGERY | Facility: HOSPITAL | Age: 59
End: 2024-07-09
Payer: COMMERCIAL

## 2024-07-09 VITALS
DIASTOLIC BLOOD PRESSURE: 71 MMHG | SYSTOLIC BLOOD PRESSURE: 133 MMHG | OXYGEN SATURATION: 97 % | RESPIRATION RATE: 16 BRPM | HEART RATE: 61 BPM | TEMPERATURE: 99 F

## 2024-07-09 DIAGNOSIS — H25.812 COMBINED FORM OF AGE-RELATED CATARACT, LEFT EYE: ICD-10-CM

## 2024-07-09 PROCEDURE — 37000009 HC ANESTHESIA EA ADD 15 MINS: Performed by: STUDENT IN AN ORGANIZED HEALTH CARE EDUCATION/TRAINING PROGRAM

## 2024-07-09 PROCEDURE — 94761 N-INVAS EAR/PLS OXIMETRY MLT: CPT

## 2024-07-09 PROCEDURE — 99900035 HC TECH TIME PER 15 MIN (STAT)

## 2024-07-09 PROCEDURE — 36000707: Performed by: STUDENT IN AN ORGANIZED HEALTH CARE EDUCATION/TRAINING PROGRAM

## 2024-07-09 PROCEDURE — 71000015 HC POSTOP RECOV 1ST HR: Performed by: STUDENT IN AN ORGANIZED HEALTH CARE EDUCATION/TRAINING PROGRAM

## 2024-07-09 PROCEDURE — 63600175 PHARM REV CODE 636 W HCPCS: Performed by: STUDENT IN AN ORGANIZED HEALTH CARE EDUCATION/TRAINING PROGRAM

## 2024-07-09 PROCEDURE — 25000003 PHARM REV CODE 250: Performed by: STUDENT IN AN ORGANIZED HEALTH CARE EDUCATION/TRAINING PROGRAM

## 2024-07-09 PROCEDURE — 36000706: Performed by: STUDENT IN AN ORGANIZED HEALTH CARE EDUCATION/TRAINING PROGRAM

## 2024-07-09 PROCEDURE — 37000008 HC ANESTHESIA 1ST 15 MINUTES: Performed by: STUDENT IN AN ORGANIZED HEALTH CARE EDUCATION/TRAINING PROGRAM

## 2024-07-09 PROCEDURE — 66984 XCAPSL CTRC RMVL W/O ECP: CPT | Mod: LT,,, | Performed by: STUDENT IN AN ORGANIZED HEALTH CARE EDUCATION/TRAINING PROGRAM

## 2024-07-09 PROCEDURE — V2632 POST CHMBR INTRAOCULAR LENS: HCPCS | Performed by: STUDENT IN AN ORGANIZED HEALTH CARE EDUCATION/TRAINING PROGRAM

## 2024-07-09 PROCEDURE — 63600175 PHARM REV CODE 636 W HCPCS: Performed by: NURSE ANESTHETIST, CERTIFIED REGISTERED

## 2024-07-09 DEVICE — LENS EYHANCE +20.5D: Type: IMPLANTABLE DEVICE | Site: EYE | Status: FUNCTIONAL

## 2024-07-09 RX ORDER — MOXIFLOXACIN 5 MG/ML
SOLUTION/ DROPS OPHTHALMIC
Status: DISCONTINUED | OUTPATIENT
Start: 2024-07-09 | End: 2024-07-09 | Stop reason: HOSPADM

## 2024-07-09 RX ORDER — MIDAZOLAM HYDROCHLORIDE 1 MG/ML
INJECTION INTRAMUSCULAR; INTRAVENOUS
Status: DISCONTINUED | OUTPATIENT
Start: 2024-07-09 | End: 2024-07-09

## 2024-07-09 RX ORDER — TROPICAMIDE 10 MG/ML
1 SOLUTION/ DROPS OPHTHALMIC EVERY 5 MIN PRN
Status: COMPLETED | OUTPATIENT
Start: 2024-07-09 | End: 2024-07-09

## 2024-07-09 RX ORDER — PREDNISOLONE ACETATE 10 MG/ML
1 SUSPENSION/ DROPS OPHTHALMIC 4 TIMES DAILY
Qty: 5 ML | Refills: 1 | Status: SHIPPED | OUTPATIENT
Start: 2024-07-09 | End: 2025-07-09

## 2024-07-09 RX ORDER — PREDNISOLONE ACETATE 10 MG/ML
SUSPENSION/ DROPS OPHTHALMIC
Status: DISCONTINUED | OUTPATIENT
Start: 2024-07-09 | End: 2024-07-09 | Stop reason: HOSPADM

## 2024-07-09 RX ORDER — KETOROLAC TROMETHAMINE 5 MG/ML
1 SOLUTION OPHTHALMIC 4 TIMES DAILY
Qty: 10 ML | Refills: 1 | Status: SHIPPED | OUTPATIENT
Start: 2024-07-09

## 2024-07-09 RX ORDER — MOXIFLOXACIN 5 MG/ML
SOLUTION/ DROPS OPHTHALMIC
Qty: 3 ML | Refills: 0 | Status: SHIPPED | OUTPATIENT
Start: 2024-07-09

## 2024-07-09 RX ORDER — TETRACAINE HYDROCHLORIDE 5 MG/ML
1 SOLUTION OPHTHALMIC EVERY 5 MIN PRN
Status: COMPLETED | OUTPATIENT
Start: 2024-07-09 | End: 2024-07-09

## 2024-07-09 RX ORDER — PHENYLEPHRINE HYDROCHLORIDE 25 MG/ML
1 SOLUTION/ DROPS OPHTHALMIC EVERY 5 MIN PRN
Status: COMPLETED | OUTPATIENT
Start: 2024-07-09 | End: 2024-07-09

## 2024-07-09 RX ORDER — SODIUM CHLORIDE 0.9 % (FLUSH) 0.9 %
10 SYRINGE (ML) INJECTION
Status: DISCONTINUED | OUTPATIENT
Start: 2024-07-09 | End: 2024-07-09 | Stop reason: HOSPADM

## 2024-07-09 RX ORDER — CYCLOPENTOLATE HYDROCHLORIDE 10 MG/ML
1 SOLUTION/ DROPS OPHTHALMIC EVERY 5 MIN PRN
Status: COMPLETED | OUTPATIENT
Start: 2024-07-09 | End: 2024-07-09

## 2024-07-09 RX ORDER — MOXIFLOXACIN 5 MG/ML
1 SOLUTION/ DROPS OPHTHALMIC
Status: COMPLETED | OUTPATIENT
Start: 2024-07-09 | End: 2024-07-09

## 2024-07-09 RX ORDER — LIDOCAINE HYDROCHLORIDE 40 MG/ML
INJECTION, SOLUTION RETROBULBAR
Status: DISCONTINUED | OUTPATIENT
Start: 2024-07-09 | End: 2024-07-09 | Stop reason: HOSPADM

## 2024-07-09 RX ORDER — PROPARACAINE HYDROCHLORIDE 5 MG/ML
SOLUTION/ DROPS OPHTHALMIC
Status: DISCONTINUED | OUTPATIENT
Start: 2024-07-09 | End: 2024-07-09 | Stop reason: HOSPADM

## 2024-07-09 RX ADMIN — MIDAZOLAM HYDROCHLORIDE 1 MG: 1 INJECTION, SOLUTION INTRAMUSCULAR; INTRAVENOUS at 10:07

## 2024-07-09 RX ADMIN — TROPICAMIDE 1 DROP: 10 SOLUTION/ DROPS OPHTHALMIC at 09:07

## 2024-07-09 RX ADMIN — CYCLOPENTOLATE HYDROCHLORIDE 1 DROP: 10 SOLUTION/ DROPS OPHTHALMIC at 09:07

## 2024-07-09 RX ADMIN — TETRACAINE HYDROCHLORIDE 1 DROP: 5 SOLUTION OPHTHALMIC at 09:07

## 2024-07-09 RX ADMIN — MOXIFLOXACIN OPHTHALMIC 1 DROP: 5 SOLUTION/ DROPS OPHTHALMIC at 09:07

## 2024-07-09 RX ADMIN — PHENYLEPHRINE HYDROCHLORIDE 1 DROP: 25 SOLUTION/ DROPS OPHTHALMIC at 09:07

## 2024-07-09 NOTE — OP NOTE
DATE OF SURGERY: 7/9/2024    PREOPERATIVE DIAGNOSES:  1. Visually significant combined form cataract, left eye.    POSTOPERATIVE DIAGNOSES:  Same    PROCEDURES PERFORMED:  Cataract extraction with intraocular lens implant, left eye    ATTENDING SURGEON:  Liseth Solano M.D.    ANESTHESIA:  MAC    COMPLICATIONS:  None.    IMPLANTS:   Implant Name Type Inv. Item Serial No.  Lot No. LRB No. Used Action   LENS EYHANCE +20.5D - [id number]  LENS EYHANCE +20.5D [id number] [employer] [facility]  Left 1 Implanted       JUSTIFICATION OF SURGERY:     Ryanne Mccauley is a 58 y.o. female with a history and physical exam consistent with a visually significant cataract. We discussed her medical conditions and treatment options, including the risks, benefits, and alternatives of surgery. she expressed her understanding of the risks, benefits, and alternatives to the procedure including, but not limited to infection, bleeding, loss of vision, loss of eye, corneal edema, need for glasses, and need for further surgical intervention. After answering all her questions, there was an understanding of the issues involved with surgery and the consent was signed.    PROCEDURE:  Local anesthesia  Betadine paint and drop in holding room   Prep and drape in usual manner in OR  Time out according to protocol  Temporal lid speculum placed  Paracentesis made with sideport blade  Lidocaine, Shugarcaine, and trypan blue injected and rinsed out with BSS Viscoelastic injected  Clear corneal incision made with 2.5 mm keratome blade  Continuous curvilinear capsulorrhexis created using a prebent cystotome and Utrata forceps  Gentle hydrodissection until nucleus was mobile  Phacoemulsification tip used to disassemble the lens and remove it  Removal of remaining cortical material and epinuclear shell with the irrigation and aspiration handpiece  Capsular bag inflated with Provisc  ORA used to check IOL calculations; decision  made to change IOL power to DIBOO 20.5 based on post operative measurements  Intraocular lens injected into the capsular bag and centered  Viscoelastic removed with the irrigation and aspiration handpiece  Both wounds hydrated, wounds checked and found to be watertight  Speculum removed from the eye.  Anti-inflammatory and antibiotic drops instilled into the eye  Patch and plastic shield placed on the eye    The patient tolerated the procedure well. There were no complications and the patient left the operating room in good condition. Arrangements were made for the patient to be seen in the outpatient clinic on the first postoperative day.

## 2024-07-09 NOTE — DISCHARGE SUMMARY
[facility]  Discharge Note  Short Stay    Procedure(s) (LRB):  EXTRACTION, CATARACT, WITH IOL INSERTION (Left)  INSERTION, IOL PROSTHESIS (Left)    OUTCOME: Patient tolerated treatment/procedure well without complication and is now ready for discharge.    DISPOSITION: Home or Self Care    FINAL DIAGNOSIS:  Combined form of age-related cataract, left eye    FOLLOWUP: In clinic    DISCHARGE INSTRUCTIONS:  No discharge procedures on file.     TIME SPENT ON DISCHARGE: 5 minutes

## 2024-07-09 NOTE — INTERVAL H&P NOTE
BRIEF HISTORY, PERTINENT EXAM:  H&P reviewed. No changes.    ASSESSMENT/PLAN:  Proceed with surgery as planned -- phaco/IOL left eye    Liseth Solano MD

## 2024-07-09 NOTE — DISCHARGE INSTRUCTIONS
POST-OPERATIVE CATARACT DROP INSTRUCTIONS    Keep the eye shield on at all times except while instilling the drops below.    In the operative eye:  Prednisolone Acetate (PINK or WHITE top) and Ketorolac (GREY top)  Place 1 drop 4 times daily x 1 week THEN  Starting 7/16/2024, place 1 drop 3 times daily x 1 week THEN  Starting 7/23/2024, place 1 drop 2 times daily x 1 week THEN  Starting 7/30/2024, place 1 drop 1 time daily x 1 week THEN STOP (end date: 8/6/2024).    Moxifloxacin (TAN top)  Place 1 drop 4 times daily x 7 days then STOP (end date: 7/16/2024).    ---------------------------------------------------------------------------------------    Continue all drops in the other eye as before.    Wait 5 minutes between the drops. The order of the drops is not important.    For the prednisolone, please shake the bottle before applying the drop    No eye make-up for 2 weeks  No heavy lifting, bending or straining for 10 days  Do not rub your eyes for 1 month  Do not get water in your eyes for 1 week  No swimming for 1 month  Please sleep with the shield over your eye for the next week to protect your eye during sleep    If you experience any increasing redness, sensitivity to light, pain, or changes in vision, please call the office immediately.    Liseth oSlano MD  Office number: [phone]

## 2024-07-09 NOTE — TELEPHONE ENCOUNTER
Spoke with patient regarding her concerns     ----- Message from Latrice Noonan sent at 7/8/2024  3:09 PM CDT -----  Regarding: FW: Consult/Advisory  Contact: Ryanne Mccauley    ----- Message -----  From: Nava Luther  Sent: 7/8/2024   1:40 PM CDT  To: Liseth Solano Staff  Subject: Consult/Advisory                                    Consult/Advisory     Name Of Caller:Ryanne Mccauley         Contact Preference:[phone]     Nature of call:Patient is calling to surgery arrival time and info. Requesting a call back

## 2024-07-09 NOTE — TRANSFER OF CARE
Anesthesia Transfer of Care Note    Patient: Ryanne Mccauley    Procedure(s) Performed: Procedure(s) (LRB):  EXTRACTION, CATARACT, WITH IOL INSERTION (Left)  INSERTION, IOL PROSTHESIS (Left)    Patient location: PACU    Anesthesia Type: MAC    Transport from OR: Transported from OR on room air with adequate spontaneous ventilation    Post pain: adequate analgesia    Post assessment: no apparent anesthetic complications    Post vital signs: stable    Level of consciousness: awake    Nausea/Vomiting: no nausea/vomiting    Complications: none    Transfer of care protocol was followed      Last vitals: Visit Vitals  /75 (Patient Position: Sitting)   Pulse 70   Temp 36.9 °C (98.4 °F) (Temporal)   Resp 16   SpO2 99%   Breastfeeding No

## 2024-07-09 NOTE — ANESTHESIA PREPROCEDURE EVALUATION
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    Hypertension     Mild obesity 04/03/2017    4/3/2017: Weight 76 kg. BMI 31.    Ventricular tachycardia 04/03/2017    3/6/2017: Short run after NSTEMI.       ECHO: No results found for this or any previous visit.      There is no height or weight on file to calculate BMI.    Tobacco Use: Low Risk  (7/9/2024)    Patient History     Smoking Tobacco Use: Never     Smokeless Tobacco Use: Never     Passive Exposure: Not on file       Social History     Substance and Sexual Activity   Drug Use No        Alcohol Use: Not on file       Review of patient's allergies indicates:   Allergen Reactions    Ace inhibitors          Airway:  No value filed.         Pre-op Assessment    I have reviewed the Patient Summary Reports.     I have reviewed the Nursing Notes. I have reviewed the NPO Status.   I have reviewed the Medications.     Review of Systems  Anesthesia Hx:  No problems with previous Anesthesia   History of prior surgery of interest to airway management  or planning:          Denies Family Hx of Anesthesia complications.    Denies Personal Hx of Anesthesia complications.                    EENT/Dental:   Eyes: Visual Impairment   Has Bilateral and S/P Extraction - Bilateral Catarract                  Cardiovascular:     Hypertension   CAD          PVD hyperlipidemia       Coronary Artery Disease:                            Hypertension     Disorder of Cardiac Rhythm  Disorder of Cardiac Conduction    Pulmonary:    Asthma                        Physical Exam  General: Well nourished, Alert, Cooperative, Oriented and Anxious    Airway:  Mallampati: II / II  Mouth Opening: Normal  Tongue: Normal  Neck ROM: Normal ROM    Dental:  Intact, Periodontal disease        Anesthesia Plan  Type of Anesthesia, risks & benefits discussed:    Anesthesia Type: MAC  Intra-op Monitoring Plan: Standard ASA Monitors  Post Op Pain Control Plan: multimodal analgesia and IV/PO Opioids PRN  Induction:  IV  Airway Plan: , Post-Induction  Informed Consent: Informed consent signed with the Patient and all parties understand the risks and agree with anesthesia plan.  All questions answered. Patient consented to blood products? No  ASA Score: 3    Ready For Surgery From Anesthesia Perspective.     .

## 2024-07-09 NOTE — PLAN OF CARE
Pt in preop bay 1, VSS, meds given and IV inserted. Pt denies any open wounds on body or the use of any weight loss injections. Pt needs site marked and anesthesia consents signed, otherwise ready to roll.    Procedural consents verified with pt.

## 2024-07-09 NOTE — ANESTHESIA POSTPROCEDURE EVALUATION
Anesthesia Post Evaluation    Patient: Ryanne Mccauley    Procedure(s) Performed: Procedure(s) (LRB):  EXTRACTION, CATARACT, WITH IOL INSERTION (Left)  INSERTION, IOL PROSTHESIS (Left)    Final Anesthesia Type: MAC      Patient location during evaluation: PACU  Patient participation: Yes- Able to Participate  Level of consciousness: awake and alert and oriented  Post-procedure vital signs: reviewed and stable  Pain management: adequate  Airway patency: patent    PONV status at discharge: No PONV  Anesthetic complications: no      Cardiovascular status: hemodynamically stable  Respiratory status: unassisted  Hydration status: euvolemic  Follow-up not needed.              Vitals Value Taken Time   /71 07/09/24 1058   Temp 37 °C (98.6 °F) 07/09/24 1045   Pulse 60 07/09/24 1103   Resp 19 07/09/24 1103   SpO2 98 % 07/09/24 1103   Vitals shown include unfiled device data.      No case tracking events are documented in the log.      Pain/Laverne Score: Laverne Score: 10 (7/9/2024 10:45 AM)

## 2024-07-10 ENCOUNTER — OFFICE VISIT (OUTPATIENT)
Dept: OPHTHALMOLOGY | Facility: CLINIC | Age: 59
End: 2024-07-10
Payer: COMMERCIAL

## 2024-07-10 DIAGNOSIS — Z96.1 STATUS POST CATARACT EXTRACTION AND INSERTION OF INTRAOCULAR LENS, LEFT: Primary | ICD-10-CM

## 2024-07-10 DIAGNOSIS — Z98.42 STATUS POST CATARACT EXTRACTION AND INSERTION OF INTRAOCULAR LENS, LEFT: Primary | ICD-10-CM

## 2024-07-10 PROCEDURE — 99024 POSTOP FOLLOW-UP VISIT: CPT | Mod: S$GLB,,, | Performed by: STUDENT IN AN ORGANIZED HEALTH CARE EDUCATION/TRAINING PROGRAM

## 2024-07-10 PROCEDURE — 99999 PR PBB SHADOW E&M-EST. PATIENT-LVL I: CPT | Mod: PBBFAC,,, | Performed by: STUDENT IN AN ORGANIZED HEALTH CARE EDUCATION/TRAINING PROGRAM

## 2024-07-10 NOTE — PROGRESS NOTES
HPI    S/P Phaco/IOL OS 07/09/2024  1 day post-op   Pt is happy with results    PF QID OS  Vigamox QID OS  Ketorolac QID OS    S/P Phaco/IOL OD 5/14/2024  Last edited by Blaire Downey MA on 7/10/2024 10:02 AM.            Assessment /Plan     For exam results, see Encounter Report.    Status post cataract extraction and insertion of intraocular lens, left    POD#1 s/p phaco/IOL OS 7/9/24  Doing well    - Continue PF 4-3-2-1, Acular 4-3-2-1 OS qweekly then stop  - Continue Moxi 4x OS x 7 days then stop    RTC POM#1 -- [facility], IOP, Mrx PRN, Dilate

## 2024-08-20 ENCOUNTER — OFFICE VISIT (OUTPATIENT)
Dept: OPHTHALMOLOGY | Facility: CLINIC | Age: 59
End: 2024-08-20
Payer: COMMERCIAL

## 2024-08-20 DIAGNOSIS — Z96.1 PSEUDOPHAKIA OF BOTH EYES: Primary | ICD-10-CM

## 2024-08-20 PROCEDURE — 1159F MED LIST DOCD IN RCRD: CPT | Mod: CPTII,S$GLB,, | Performed by: STUDENT IN AN ORGANIZED HEALTH CARE EDUCATION/TRAINING PROGRAM

## 2024-08-20 PROCEDURE — 99024 POSTOP FOLLOW-UP VISIT: CPT | Mod: S$GLB,,, | Performed by: STUDENT IN AN ORGANIZED HEALTH CARE EDUCATION/TRAINING PROGRAM

## 2024-08-20 PROCEDURE — 99999 PR PBB SHADOW E&M-EST. PATIENT-LVL III: CPT | Mod: PBBFAC,,, | Performed by: STUDENT IN AN ORGANIZED HEALTH CARE EDUCATION/TRAINING PROGRAM

## 2024-08-20 NOTE — PROGRESS NOTES
HPI    DLS: 7/10/24 w/ Dr. Solano    58 y.o. female presents for 1 month PO. Patient reports improved vision.   No trouble with glare. Denies eye pain, headaches, flashes, and floaters.     Meds:  none  Last edited by Meaghan Miller on 8/20/2024  1:19 PM.            Assessment /Plan     For exam results, see Encounter Report.    Pseudophakia of both eyes    S/p phaco/IOL OD 5/14/24  S/p phaco/IOL OS 7/9/24    Plan:  ATs PRN  OTC readers    RTC me PRN  Dr. Jones as scheduled

## (undated) DEVICE — GLOVE BIOGEL SKINSENSE PI 6.5

## (undated) DEVICE — NDL HYPO REG 25G X 1 1/2

## (undated) DEVICE — SOL WATER STRL IRR 1000ML

## (undated) DEVICE — KNIFE OPHTHALMIC 15 DEG

## (undated) DEVICE — DRAPE OPHTHALMIC 48X62 FEN

## (undated) DEVICE — CANNULA NUCLEUS HYRDODISECTOR

## (undated) DEVICE — DRESSING TRANS 2X2 TEGADERM

## (undated) DEVICE — SYR LUER LOCK 1CC

## (undated) DEVICE — STRIP MEDI WND CLSR 1/2X4IN

## (undated) DEVICE — SOL BETADINE 5%

## (undated) DEVICE — NDL FLTR 5MCRN BLNT TIP 18GX1

## (undated) DEVICE — DRESSING TRANS 4X4 TEGADERM